# Patient Record
Sex: FEMALE | Race: OTHER | NOT HISPANIC OR LATINO
[De-identification: names, ages, dates, MRNs, and addresses within clinical notes are randomized per-mention and may not be internally consistent; named-entity substitution may affect disease eponyms.]

---

## 2020-04-22 ENCOUNTER — TRANSCRIPTION ENCOUNTER (OUTPATIENT)
Age: 30
End: 2020-04-22

## 2021-12-03 ENCOUNTER — EMERGENCY (EMERGENCY)
Facility: HOSPITAL | Age: 31
LOS: 1 days | Discharge: HOME | End: 2021-12-03
Attending: EMERGENCY MEDICINE | Admitting: EMERGENCY MEDICINE
Payer: COMMERCIAL

## 2021-12-03 VITALS
SYSTOLIC BLOOD PRESSURE: 112 MMHG | DIASTOLIC BLOOD PRESSURE: 67 MMHG | OXYGEN SATURATION: 100 % | TEMPERATURE: 98 F | RESPIRATION RATE: 16 BRPM | HEART RATE: 87 BPM

## 2021-12-03 PROCEDURE — 99284 EMERGENCY DEPT VISIT MOD MDM: CPT

## 2021-12-03 RX ORDER — CYCLOBENZAPRINE HYDROCHLORIDE 10 MG/1
1 TABLET, FILM COATED ORAL
Qty: 21 | Refills: 0
Start: 2021-12-03 | End: 2021-12-09

## 2021-12-03 RX ORDER — CYCLOBENZAPRINE HYDROCHLORIDE 10 MG/1
10 TABLET, FILM COATED ORAL ONCE
Refills: 0 | Status: COMPLETED | OUTPATIENT
Start: 2021-12-03 | End: 2021-12-03

## 2021-12-03 RX ORDER — IBUPROFEN 200 MG
1 TABLET ORAL
Qty: 30 | Refills: 0
Start: 2021-12-03 | End: 2021-12-12

## 2021-12-03 RX ORDER — IBUPROFEN 200 MG
600 TABLET ORAL ONCE
Refills: 0 | Status: COMPLETED | OUTPATIENT
Start: 2021-12-03 | End: 2021-12-03

## 2021-12-03 RX ADMIN — Medication 600 MILLIGRAM(S): at 22:22
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
## 2021-12-03 NOTE — ED PROVIDER NOTE - PATIENT PORTAL LINK FT
You can access the FollowMyHealth Patient Portal offered by [facility] by registering at the following website: [email or web address]. By joining [employer]’s FollowMyHealth portal, you will also be able to view your health information using other applications (apps) compatible with our system.

## 2021-12-03 NOTE — ED PROVIDER NOTE - MUSCULOSKELETAL MINIMAL EXAM
Paraspinal cervical spine and lumbar spine tenderness, normal range of motion for the right shoulder

## 2021-12-04 DIAGNOSIS — N20.0 CALCULUS OF KIDNEY: Chronic | ICD-10-CM

## 2021-12-04 PROCEDURE — 73030 X-RAY EXAM OF SHOULDER: CPT | Mod: 26,RT

## 2021-12-04 PROCEDURE — 72040 X-RAY EXAM NECK SPINE 2-3 VW: CPT | Mod: 26

## 2021-12-04 PROCEDURE — 72100 X-RAY EXAM L-S SPINE 2/3 VWS: CPT | Mod: 26

## 2022-11-14 PROBLEM — Z78.9 OTHER SPECIFIED HEALTH STATUS: Chronic | Status: ACTIVE | Noted: 2021-12-04

## 2022-12-13 ENCOUNTER — APPOINTMENT (OUTPATIENT)
Dept: UROLOGY | Facility: CLINIC | Age: 32
End: 2022-12-13

## 2022-12-13 PROBLEM — Z00.00 ENCOUNTER FOR PREVENTIVE HEALTH EXAMINATION: Status: ACTIVE | Noted: 2022-12-13

## 2023-07-18 ENCOUNTER — APPOINTMENT (OUTPATIENT)
Dept: ANTEPARTUM | Facility: CLINIC | Age: 33
End: 2023-07-18
Payer: COMMERCIAL

## 2023-07-18 ENCOUNTER — ASOB RESULT (OUTPATIENT)
Age: 33
End: 2023-07-18

## 2023-07-18 PROCEDURE — 76813 OB US NUCHAL MEAS 1 GEST: CPT

## 2023-07-18 PROCEDURE — 36415 COLL VENOUS BLD VENIPUNCTURE: CPT

## 2023-07-26 LAB
ADDITIONAL US: NORMAL
COMMENTS: AFP: NORMAL
CRL SCAN TWIN B: NORMAL
CRL SCAN: NORMAL
CROWN RUMP LENGTH TWIN B: NORMAL
CROWN RUMP LENGTH: 69 MM
DOWN SYNDROME AGE RISK: NORMAL
DOWN SYNDROME INTERPRETATION: NORMAL
DOWN SYNDROME SCREENING RISK: NORMAL
GEST. AGE ON COLLECTION DATE: 13 WEEKS
HCG MOM: 1.06
HCG VALUE: 89.7 IU/ML
MATERNAL AGE AT EDD: 32.2 YR
NOTE: AFP: NORMAL
NT MOM TWIN B: NORMAL
NT TWIN B: NORMAL
NUCHAL TRANSLUCENCY (NT): 1.2 MM
NUCHAL TRANSLUCENCY MOM: 0.67
NUMBER OF FETUSES: 1
PAPP-A MOM: 0.33
PAPP-A VALUE: 356 NG/ML
RACE: NORMAL
RESULTS AFP: NORMAL
SONOGRAPHER ID#: NORMAL
SUBMIT PART 2 SAMPLE USING: NORMAL
TEST RESULTS: AFP: NORMAL
TRISOMY 18 AGE RISK: NORMAL
TRISOMY 18 INTERPRETATION: NORMAL
TRISOMY 18 SCREENING RISK: NORMAL
WEIGHT AFP: 165 LBS

## 2023-08-07 NOTE — ED ADULT TRIAGE NOTE - BP NONINVASIVE DIASTOLIC (MM HG)
Spoke to patient about getting vitamin D drawn as she was advised to repeat it. Lab appointment has been scheduled. Patient would also like copy of DEXA scan report once resulted.    Medication(s) Requested: Vitamin D  Last office visit: 7/12/2023  Next office visit: 1/15/2024  Labs: 5/17/2023 - repeat in 2 months due to increase to 5,000 IU/day      ---- OV 7/12/2023 ----  Age related osteoporosis, unspecified pathological fracture presence  Currently on Reclast with most recent infusion being 2/13/23. Status post x 7 infusions.  - Recommend to start bisphosphonate holiday for now.  - Repeat DEXA scheduled 8/3/23.   - Continue calcium and vitamin D supplementation.       
68
no

## 2023-09-03 ENCOUNTER — NON-APPOINTMENT (OUTPATIENT)
Age: 33
End: 2023-09-03

## 2023-09-12 ENCOUNTER — APPOINTMENT (OUTPATIENT)
Dept: ANTEPARTUM | Facility: CLINIC | Age: 33
End: 2023-09-12
Payer: COMMERCIAL

## 2023-09-12 ENCOUNTER — ASOB RESULT (OUTPATIENT)
Age: 33
End: 2023-09-12

## 2023-09-12 PROCEDURE — 76811 OB US DETAILED SNGL FETUS: CPT

## 2023-09-12 PROCEDURE — 76817 TRANSVAGINAL US OBSTETRIC: CPT

## 2023-09-17 ENCOUNTER — NON-APPOINTMENT (OUTPATIENT)
Age: 33
End: 2023-09-17

## 2023-09-20 ENCOUNTER — ASOB RESULT (OUTPATIENT)
Age: 33
End: 2023-09-20

## 2023-09-20 ENCOUNTER — APPOINTMENT (OUTPATIENT)
Dept: ANTEPARTUM | Facility: CLINIC | Age: 33
End: 2023-09-20
Payer: COMMERCIAL

## 2023-09-20 PROCEDURE — 76816 OB US FOLLOW-UP PER FETUS: CPT

## 2023-10-04 ENCOUNTER — APPOINTMENT (OUTPATIENT)
Dept: ANTEPARTUM | Facility: CLINIC | Age: 33
End: 2023-10-04
Payer: COMMERCIAL

## 2023-10-04 ENCOUNTER — APPOINTMENT (OUTPATIENT)
Dept: MATERNAL FETAL MEDICINE | Facility: CLINIC | Age: 33
End: 2023-10-04
Payer: COMMERCIAL

## 2023-10-04 VITALS
DIASTOLIC BLOOD PRESSURE: 58 MMHG | BODY MASS INDEX: 31.18 KG/M2 | HEIGHT: 63 IN | SYSTOLIC BLOOD PRESSURE: 118 MMHG | WEIGHT: 176 LBS

## 2023-10-04 VITALS
HEART RATE: 109 BPM | HEIGHT: 63 IN | BODY MASS INDEX: 31.18 KG/M2 | WEIGHT: 176 LBS | SYSTOLIC BLOOD PRESSURE: 118 MMHG | OXYGEN SATURATION: 97 % | DIASTOLIC BLOOD PRESSURE: 58 MMHG

## 2023-10-04 PROCEDURE — 76816 OB US FOLLOW-UP PER FETUS: CPT

## 2023-10-04 PROCEDURE — ZZZZZ: CPT

## 2023-10-04 PROCEDURE — 99213 OFFICE O/P EST LOW 20 MIN: CPT

## 2023-10-04 RX ORDER — CHOLECALCIFEROL (VITAMIN D3) 25 MCG
27-0.8-228 TABLET,CHEWABLE ORAL
Refills: 0 | Status: ACTIVE | COMMUNITY
Start: 2023-10-04

## 2023-10-04 RX ORDER — KRILL/OM-3/DHA/EPA/PHOSPHO/AST 1000-230MG
81 CAPSULE ORAL
Refills: 0 | Status: ACTIVE | COMMUNITY
Start: 2023-10-04

## 2023-10-31 ENCOUNTER — ASOB RESULT (OUTPATIENT)
Age: 33
End: 2023-10-31

## 2023-10-31 ENCOUNTER — APPOINTMENT (OUTPATIENT)
Dept: MATERNAL FETAL MEDICINE | Facility: CLINIC | Age: 33
End: 2023-10-31
Payer: COMMERCIAL

## 2023-10-31 VITALS — BODY MASS INDEX: 28.35 KG/M2 | WEIGHT: 160 LBS | HEIGHT: 63 IN

## 2023-10-31 PROCEDURE — G0108 DIAB MANAGE TRN  PER INDIV: CPT | Mod: 95

## 2023-10-31 RX ORDER — BLOOD-GLUCOSE METER
W/DEVICE KIT MISCELLANEOUS
Qty: 1 | Refills: 0 | Status: ACTIVE | COMMUNITY
Start: 2023-10-31 | End: 1900-01-01

## 2023-10-31 RX ORDER — BLOOD-GLUCOSE METER
KIT MISCELLANEOUS
Qty: 1 | Refills: 3 | Status: ACTIVE | COMMUNITY
Start: 2023-10-31 | End: 1900-01-01

## 2023-10-31 RX ORDER — CHOLECALCIFEROL (VITAMIN D3) 10(400)/ML
DROPS ORAL
Qty: 100 | Refills: 3 | Status: ACTIVE | COMMUNITY
Start: 2023-10-31 | End: 1900-01-01

## 2023-11-02 ENCOUNTER — ASOB RESULT (OUTPATIENT)
Age: 33
End: 2023-11-02

## 2023-11-02 ENCOUNTER — APPOINTMENT (OUTPATIENT)
Dept: ANTEPARTUM | Facility: CLINIC | Age: 33
End: 2023-11-02
Payer: COMMERCIAL

## 2023-11-02 ENCOUNTER — APPOINTMENT (OUTPATIENT)
Dept: ANTEPARTUM | Facility: CLINIC | Age: 33
End: 2023-11-02

## 2023-11-02 PROCEDURE — 76816 OB US FOLLOW-UP PER FETUS: CPT

## 2023-11-03 RX ORDER — BLOOD-GLUCOSE METER
W/DEVICE KIT MISCELLANEOUS
Qty: 1 | Refills: 0 | Status: ACTIVE | COMMUNITY
Start: 2023-11-03 | End: 1900-01-01

## 2023-11-03 RX ORDER — BLOOD SUGAR DIAGNOSTIC
STRIP MISCELLANEOUS 4 TIMES DAILY
Qty: 1 | Refills: 3 | Status: ACTIVE | COMMUNITY
Start: 2023-11-03 | End: 1900-01-01

## 2023-11-03 RX ORDER — LANCETS 28 GAUGE
EACH MISCELLANEOUS
Qty: 1 | Refills: 3 | Status: ACTIVE | COMMUNITY
Start: 2023-11-03 | End: 1900-01-01

## 2023-11-06 ENCOUNTER — ASOB RESULT (OUTPATIENT)
Age: 33
End: 2023-11-06

## 2023-11-06 ENCOUNTER — APPOINTMENT (OUTPATIENT)
Dept: ANTEPARTUM | Facility: CLINIC | Age: 33
End: 2023-11-06
Payer: COMMERCIAL

## 2023-11-06 PROCEDURE — 76820 UMBILICAL ARTERY ECHO: CPT

## 2023-11-06 PROCEDURE — 76819 FETAL BIOPHYS PROFIL W/O NST: CPT

## 2023-11-07 ENCOUNTER — ASOB RESULT (OUTPATIENT)
Age: 33
End: 2023-11-07

## 2023-11-07 ENCOUNTER — APPOINTMENT (OUTPATIENT)
Dept: MATERNAL FETAL MEDICINE | Facility: CLINIC | Age: 33
End: 2023-11-07
Payer: COMMERCIAL

## 2023-11-07 PROCEDURE — G0108 DIAB MANAGE TRN  PER INDIV: CPT | Mod: 95

## 2023-11-13 ENCOUNTER — APPOINTMENT (OUTPATIENT)
Dept: ANTEPARTUM | Facility: CLINIC | Age: 33
End: 2023-11-13

## 2023-11-14 ENCOUNTER — ASOB RESULT (OUTPATIENT)
Age: 33
End: 2023-11-14

## 2023-11-14 ENCOUNTER — APPOINTMENT (OUTPATIENT)
Dept: MATERNAL FETAL MEDICINE | Facility: CLINIC | Age: 33
End: 2023-11-14
Payer: COMMERCIAL

## 2023-11-14 ENCOUNTER — APPOINTMENT (OUTPATIENT)
Dept: ANTEPARTUM | Facility: CLINIC | Age: 33
End: 2023-11-14
Payer: COMMERCIAL

## 2023-11-14 ENCOUNTER — NON-APPOINTMENT (OUTPATIENT)
Age: 33
End: 2023-11-14

## 2023-11-14 VITALS
HEART RATE: 94 BPM | WEIGHT: 177 LBS | BODY MASS INDEX: 31.36 KG/M2 | HEIGHT: 63 IN | RESPIRATION RATE: 16 BRPM | DIASTOLIC BLOOD PRESSURE: 64 MMHG | OXYGEN SATURATION: 99 % | SYSTOLIC BLOOD PRESSURE: 110 MMHG

## 2023-11-14 DIAGNOSIS — O35.9XX0 MATERNAL CARE FOR (SUSPECTED) FETAL ABNORMALITY AND DAMAGE, UNSPECIFIED, NOT APPLICABLE OR UNSPECIFIED: ICD-10-CM

## 2023-11-14 DIAGNOSIS — O24.419 GESTATIONAL DIABETES MELLITUS IN PREGNANCY, UNSPECIFIED CONTROL: ICD-10-CM

## 2023-11-14 PROCEDURE — 76820 UMBILICAL ARTERY ECHO: CPT

## 2023-11-14 PROCEDURE — 99214 OFFICE O/P EST MOD 30 MIN: CPT

## 2023-11-14 PROCEDURE — 76819 FETAL BIOPHYS PROFIL W/O NST: CPT

## 2023-11-20 ENCOUNTER — ASOB RESULT (OUTPATIENT)
Age: 33
End: 2023-11-20

## 2023-11-20 ENCOUNTER — APPOINTMENT (OUTPATIENT)
Dept: ANTEPARTUM | Facility: CLINIC | Age: 33
End: 2023-11-20
Payer: COMMERCIAL

## 2023-11-20 PROCEDURE — 76820 UMBILICAL ARTERY ECHO: CPT

## 2023-11-20 PROCEDURE — 76819 FETAL BIOPHYS PROFIL W/O NST: CPT

## 2023-11-27 ENCOUNTER — ASOB RESULT (OUTPATIENT)
Age: 33
End: 2023-11-27

## 2023-11-27 ENCOUNTER — APPOINTMENT (OUTPATIENT)
Dept: ANTEPARTUM | Facility: CLINIC | Age: 33
End: 2023-11-27
Payer: COMMERCIAL

## 2023-11-27 PROCEDURE — 76820 UMBILICAL ARTERY ECHO: CPT

## 2023-11-27 PROCEDURE — 76819 FETAL BIOPHYS PROFIL W/O NST: CPT

## 2023-11-28 ENCOUNTER — ASOB RESULT (OUTPATIENT)
Age: 33
End: 2023-11-28

## 2023-11-28 ENCOUNTER — APPOINTMENT (OUTPATIENT)
Dept: MATERNAL FETAL MEDICINE | Facility: CLINIC | Age: 33
End: 2023-11-28
Payer: COMMERCIAL

## 2023-11-28 VITALS — WEIGHT: 175 LBS | BODY MASS INDEX: 31.01 KG/M2 | HEIGHT: 63 IN

## 2023-11-28 PROCEDURE — G0108 DIAB MANAGE TRN  PER INDIV: CPT | Mod: 95

## 2023-12-04 ENCOUNTER — ASOB RESULT (OUTPATIENT)
Age: 33
End: 2023-12-04

## 2023-12-04 ENCOUNTER — APPOINTMENT (OUTPATIENT)
Dept: ANTEPARTUM | Facility: CLINIC | Age: 33
End: 2023-12-04
Payer: COMMERCIAL

## 2023-12-04 PROCEDURE — 76820 UMBILICAL ARTERY ECHO: CPT | Mod: 59

## 2023-12-04 PROCEDURE — 76816 OB US FOLLOW-UP PER FETUS: CPT

## 2023-12-04 PROCEDURE — 76819 FETAL BIOPHYS PROFIL W/O NST: CPT

## 2023-12-06 ENCOUNTER — APPOINTMENT (OUTPATIENT)
Dept: MATERNAL FETAL MEDICINE | Facility: CLINIC | Age: 33
End: 2023-12-06
Payer: COMMERCIAL

## 2023-12-06 ENCOUNTER — ASOB RESULT (OUTPATIENT)
Age: 33
End: 2023-12-06

## 2023-12-06 VITALS — BODY MASS INDEX: 30.74 KG/M2 | WEIGHT: 173.5 LBS | HEIGHT: 63 IN

## 2023-12-06 PROCEDURE — G0108 DIAB MANAGE TRN  PER INDIV: CPT | Mod: 95

## 2023-12-11 ENCOUNTER — APPOINTMENT (OUTPATIENT)
Dept: ANTEPARTUM | Facility: CLINIC | Age: 33
End: 2023-12-11
Payer: COMMERCIAL

## 2023-12-11 ENCOUNTER — ASOB RESULT (OUTPATIENT)
Age: 33
End: 2023-12-11

## 2023-12-11 PROCEDURE — 76820 UMBILICAL ARTERY ECHO: CPT

## 2023-12-11 PROCEDURE — 76819 FETAL BIOPHYS PROFIL W/O NST: CPT

## 2023-12-19 ENCOUNTER — ASOB RESULT (OUTPATIENT)
Age: 33
End: 2023-12-19

## 2023-12-19 ENCOUNTER — APPOINTMENT (OUTPATIENT)
Dept: ANTEPARTUM | Facility: CLINIC | Age: 33
End: 2023-12-19
Payer: COMMERCIAL

## 2023-12-19 PROCEDURE — 76819 FETAL BIOPHYS PROFIL W/O NST: CPT

## 2023-12-19 PROCEDURE — 76820 UMBILICAL ARTERY ECHO: CPT

## 2023-12-20 ENCOUNTER — APPOINTMENT (OUTPATIENT)
Dept: MATERNAL FETAL MEDICINE | Facility: CLINIC | Age: 33
End: 2023-12-20
Payer: COMMERCIAL

## 2023-12-20 ENCOUNTER — ASOB RESULT (OUTPATIENT)
Age: 33
End: 2023-12-20

## 2023-12-20 VITALS — BODY MASS INDEX: 30.83 KG/M2 | HEIGHT: 63 IN | WEIGHT: 174 LBS

## 2023-12-20 PROCEDURE — G0108 DIAB MANAGE TRN  PER INDIV: CPT | Mod: 95

## 2023-12-26 ENCOUNTER — APPOINTMENT (OUTPATIENT)
Dept: ANTEPARTUM | Facility: CLINIC | Age: 33
End: 2023-12-26
Payer: COMMERCIAL

## 2023-12-26 ENCOUNTER — ASOB RESULT (OUTPATIENT)
Age: 33
End: 2023-12-26

## 2023-12-26 ENCOUNTER — APPOINTMENT (OUTPATIENT)
Dept: MATERNAL FETAL MEDICINE | Facility: CLINIC | Age: 33
End: 2023-12-26
Payer: COMMERCIAL

## 2023-12-26 VITALS
HEIGHT: 63 IN | WEIGHT: 174 LBS | HEART RATE: 98 BPM | BODY MASS INDEX: 30.83 KG/M2 | OXYGEN SATURATION: 97 % | RESPIRATION RATE: 16 BRPM | SYSTOLIC BLOOD PRESSURE: 110 MMHG | DIASTOLIC BLOOD PRESSURE: 60 MMHG

## 2023-12-26 PROCEDURE — ZZZZZ: CPT

## 2023-12-26 PROCEDURE — 76818 FETAL BIOPHYS PROFILE W/NST: CPT

## 2023-12-26 PROCEDURE — 76816 OB US FOLLOW-UP PER FETUS: CPT

## 2023-12-26 PROCEDURE — 76820 UMBILICAL ARTERY ECHO: CPT | Mod: 59

## 2023-12-26 PROCEDURE — 99214 OFFICE O/P EST MOD 30 MIN: CPT

## 2023-12-27 NOTE — DISCUSSION/SUMMARY
[FreeTextEntry1] :  We had the pleasure of seeing your patient, HARRISON العراقي, for follow up Maternal Fetal Medicine consultation on Dec 26, 2023. As you know, she is a P0 at 36 weeks followed for low sujata-a, fetal growth restriction, abnormal fetal brain anatomy. She is feeling overall well today and without complaints. She has gestational diabetes which is controlled with diet.   Ms. العراقي had a fetal MRI on 12/15 due to enlarged cisterna magna. Relevant excerpt of report: "There is enlargement of the retrocerebellar cistern measuring 2.2 cm in AP dimension. The cerebellar vermis appears small but grossly normal in morphology and orientation. There is a normal tegmentovermian angle. There is no hydrocephalous. The ventricular system is normal, with normal lateral ventricles, third ventricle and fourth ventricles. There is no ventricular dilatation at any level. There is a normal cavum septum pellucidum. The corpus callosum is entirely visualized and is normal. There are no focal white matter or gray matter abnormalities. The gyral pattern is normal for age. The subarachnoid space is normal. "  I explained all of the findings appreciated on the ultrasound as well as the MRI. I discussed the nonvisualization of the pericallosal artery on the most recent scan as well as the difficulty appreciating the CSP which may suggest partial agensis of the corpus callosum.  I reviewed that if there is partial agenesis of the corpus callosum, a patient may be asymptomatic or may have seizures or developmental delay. 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 We discussed that in isolation, this is idiopathic. There is an association with blakes pouch cyst which is a usually benign cystic malformation of the posterior fossa. I do not appreciate a cyst in the posterior fossa on ultrasound.   The patient was overall reassured by the MRI report. We discussed the new onset fetal growth restriction and the recommendation for twice weekly fetal testing until delivery. She asked questions regarding delivery planning and I advised that she discuss these with her doctor.   Recommend:  - Twice weekly fetal testing with BPP/NST/UAD - Delivery between 38-39 weeks' gestation due to fetal growth restriction  Thank you for requesting a consultation on this patient. The total time spent in preparation for this visit, medical history taking, orders, review of records, counseling the patient, and writing this note was 65 minutes.   At the end of our discussion, the patient indicated that her questions were answered and she seemed satisfied with our discussion. Please do not hesitate to contact us with any questions.   Sincerely,     Carmina Bravo MD FACOG Maternal-Fetal Medicine

## 2023-12-29 ENCOUNTER — APPOINTMENT (OUTPATIENT)
Dept: ANTEPARTUM | Facility: CLINIC | Age: 33
End: 2023-12-29
Payer: COMMERCIAL

## 2023-12-29 ENCOUNTER — ASOB RESULT (OUTPATIENT)
Age: 33
End: 2023-12-29

## 2023-12-29 PROCEDURE — 76820 UMBILICAL ARTERY ECHO: CPT

## 2023-12-29 PROCEDURE — 76819 FETAL BIOPHYS PROFIL W/O NST: CPT

## 2024-01-02 ENCOUNTER — ASOB RESULT (OUTPATIENT)
Age: 34
End: 2024-01-02

## 2024-01-02 ENCOUNTER — APPOINTMENT (OUTPATIENT)
Dept: ANTEPARTUM | Facility: CLINIC | Age: 34
End: 2024-01-02
Payer: COMMERCIAL

## 2024-01-02 ENCOUNTER — APPOINTMENT (OUTPATIENT)
Dept: MATERNAL FETAL MEDICINE | Facility: CLINIC | Age: 34
End: 2024-01-02
Payer: COMMERCIAL

## 2024-01-02 VITALS — BODY MASS INDEX: 30.83 KG/M2 | WEIGHT: 174 LBS | HEIGHT: 63 IN

## 2024-01-02 PROCEDURE — 76820 UMBILICAL ARTERY ECHO: CPT

## 2024-01-02 PROCEDURE — 98960 EDU&TRN PT SELF-MGMT NQHP 1: CPT | Mod: 95

## 2024-01-02 PROCEDURE — G0108 DIAB MANAGE TRN  PER INDIV: CPT | Mod: 95

## 2024-01-02 PROCEDURE — 76819 FETAL BIOPHYS PROFIL W/O NST: CPT

## 2024-01-05 ENCOUNTER — ASOB RESULT (OUTPATIENT)
Age: 34
End: 2024-01-05

## 2024-01-05 ENCOUNTER — APPOINTMENT (OUTPATIENT)
Dept: ANTEPARTUM | Facility: CLINIC | Age: 34
End: 2024-01-05
Payer: COMMERCIAL

## 2024-01-05 PROCEDURE — 76820 UMBILICAL ARTERY ECHO: CPT

## 2024-01-05 PROCEDURE — 76819 FETAL BIOPHYS PROFIL W/O NST: CPT

## 2024-01-06 ENCOUNTER — OUTPATIENT (OUTPATIENT)
Dept: INPATIENT UNIT | Facility: HOSPITAL | Age: 34
LOS: 1 days | End: 2024-01-06
Payer: COMMERCIAL

## 2024-01-06 VITALS
HEART RATE: 103 BPM | DIASTOLIC BLOOD PRESSURE: 68 MMHG | RESPIRATION RATE: 18 BRPM | TEMPERATURE: 98 F | SYSTOLIC BLOOD PRESSURE: 116 MMHG

## 2024-01-06 DIAGNOSIS — O26.899 OTHER SPECIFIED PREGNANCY RELATED CONDITIONS, UNSPECIFIED TRIMESTER: ICD-10-CM

## 2024-01-06 DIAGNOSIS — N20.0 CALCULUS OF KIDNEY: Chronic | ICD-10-CM

## 2024-01-06 NOTE — OB RN TRIAGE NOTE - FALL HARM RISK - UNIVERSAL INTERVENTIONS
Bed in lowest position, wheels locked, appropriate side rails in place/Call bell, personal items and telephone in reach/Instruct patient to call for assistance before getting out of bed or chair/Non-slip footwear when patient is out of bed/[place] to call system/Physically safe environment - no spills, clutter or unnecessary equipment/Purposeful Proactive Rounding/Room/bathroom lighting operational, light cord in reach Bed in lowest position, wheels locked, appropriate side rails in place/Call bell, personal items and telephone in reach/Instruct patient to call for assistance before getting out of bed or chair/Non-slip footwear when patient is out of bed/[place] to call system/Physically safe environment - no spills, clutter or unnecessary equipment/Purposeful Proactive Rounding/Room/bathroom lighting operational, light cord in reach

## 2024-01-07 VITALS — DIASTOLIC BLOOD PRESSURE: 65 MMHG | HEART RATE: 88 BPM | SYSTOLIC BLOOD PRESSURE: 114 MMHG

## 2024-01-07 PROCEDURE — 87591 N.GONORRHOEAE DNA AMP PROB: CPT

## 2024-01-07 PROCEDURE — 59025 FETAL NON-STRESS TEST: CPT

## 2024-01-07 PROCEDURE — G0463: CPT

## 2024-01-07 PROCEDURE — 84112 EVAL AMNIOTIC FLUID PROTEIN: CPT

## 2024-01-07 PROCEDURE — 99203 OFFICE O/P NEW LOW 30 MIN: CPT | Mod: GC

## 2024-01-07 PROCEDURE — 87800 DETECT AGNT MULT DNA DIREC: CPT

## 2024-01-07 PROCEDURE — 87491 CHLMYD TRACH DNA AMP PROBE: CPT

## 2024-01-07 PROCEDURE — 83986 ASSAY PH BODY FLUID NOS: CPT

## 2024-01-07 NOTE — OB PROVIDER TRIAGE NOTE - HISTORY OF PRESENT ILLNESS
33y  at 37w6d GA who presents to L&D after passing her mucus plug at home, patient also unsure if she is leaking fluid. Also c/o mild vaginal spotting. Endorsing occasional CTX. She endorses good fetal movement. Denies fevers, chills, nausea, vomiting, chest pain, SOB, dizziness and headache. No other complaints at this time.     Prenatal course is significant for:  1. GDMA1    POB: denies  PGYN: -fibroids, -ovarian cysts, denies STD hx, denies abnormal PAPs   PMH: Denies  PSH: hx of shoulder and ankle surgery  SH: Denies EtOH, tobacco and illicit drug use during this pregnancy; feels safe at home   Meds: PNVs  Allergies: NKDA

## 2024-01-07 NOTE — OB PROVIDER TRIAGE NOTE - NSOBPROVIDERNOTE_OBGYN_ALL_OB_FT
A/P: 33y  at 37w6d GA who presents to L&D from r/o labor and ROM.  - VSS  - FHT reassuring  - SVE /-3  - nitrazine and amnisure negative  - GC/CT and affirm obtained    Plan to reassess for cervical change in 1 hr. Will be discussed with Dr. Mark. A/P: 33y  at 37w6d GA who presents to L&D from r/o labor and ROM.  - VSS  - FHT reassuring  - SVE /-3  - nitrazine and amnisure negative  - GC/CT and affirm obtained    Plan to reassess for cervical change in 1 hr. SVE Unchanged, patient has an outpatient appt with OBGYN on Tuesday. Return precautions given. Will be discussed with Dr. Mark.

## 2024-01-07 NOTE — OB PROVIDER TRIAGE NOTE - NSHPPHYSICALEXAM_GEN_ALL_CORE
T(C): 36.9 (01-06-24 @ 23:44), Max: 36.9 (01-06-24 @ 23:44)  HR: 103 (01-07-24 @ 00:01) (103 - 103)  BP: 116/68 (01-07-24 @ 00:01) (116/68 - 116/68)  RR: 18 (01-06-24 @ 23:44) (18 - 18)    Gen: NAD, well-appearing, AAOx3   Abd: Soft, gravid  Ext: non-tender, non-edematous  SSE: no gross pooling, thick yellow green discharge noted protruding from cervical os. No vaginal bleeding noted. Nitrazine neg, amnisure neg  SVE:  1/50/-3    FHT: baseline 150, moderate variability, +accels, -decels   [place]: CTX q4-6min T(C): 36.9 (01-06-24 @ 23:44), Max: 36.9 (01-06-24 @ 23:44)  HR: 103 (01-07-24 @ 00:01) (103 - 103)  BP: 116/68 (01-07-24 @ 00:01) (116/68 - 116/68)  RR: 18 (01-06-24 @ 23:44) (18 - 18)    Gen: NAD, well-appearing, AAOx3   Abd: Soft, gravid  Ext: non-tender, non-edematous  SSE: no gross pooling, thick yellow green discharge noted protruding from cervical os. No vaginal bleeding noted. Nitrazine neg, amnisure neg  SVE:  1/50/-3    FHT: baseline 150, moderate variability, +accels, -decels   [place]: CTX q4-6min

## 2024-01-07 NOTE — OB PROVIDER TRIAGE NOTE - ATTENDING COMMENTS
In brief patient is a 33y  at 37w6d GA who presents to L&D from r/o labor and ROM. Notes feeling contractions irregularly, no fever/chills, palpitations or foul smelling discharge. Unchanged on 2 exams by same examiner (/-3). FHT was found to be reactive and reassuring with ROM check negative. Yellow/Green discharge may be mucus however suspicion for vaginal infection, swabs sent and pending. F/u scheduled with primary OB on Tuesday and will call pt with results of swabs. Precautions reviewed.

## 2024-01-08 LAB
C TRACH RRNA SPEC QL NAA+PROBE: SIGNIFICANT CHANGE UP
C TRACH RRNA SPEC QL NAA+PROBE: SIGNIFICANT CHANGE UP
CANDIDA AB TITR SER: SIGNIFICANT CHANGE UP
CANDIDA AB TITR SER: SIGNIFICANT CHANGE UP
G VAGINALIS DNA SPEC QL NAA+PROBE: SIGNIFICANT CHANGE UP
G VAGINALIS DNA SPEC QL NAA+PROBE: SIGNIFICANT CHANGE UP
N GONORRHOEA RRNA SPEC QL NAA+PROBE: SIGNIFICANT CHANGE UP
N GONORRHOEA RRNA SPEC QL NAA+PROBE: SIGNIFICANT CHANGE UP
T VAGINALIS SPEC QL WET PREP: SIGNIFICANT CHANGE UP
T VAGINALIS SPEC QL WET PREP: SIGNIFICANT CHANGE UP

## 2024-01-09 ENCOUNTER — INPATIENT (INPATIENT)
Facility: HOSPITAL | Age: 34
LOS: 1 days | Discharge: HOME | End: 2024-01-11
Attending: OBSTETRICS & GYNECOLOGY | Admitting: OBSTETRICS & GYNECOLOGY
Payer: COMMERCIAL

## 2024-01-09 ENCOUNTER — APPOINTMENT (OUTPATIENT)
Dept: ANTEPARTUM | Facility: CLINIC | Age: 34
End: 2024-01-09

## 2024-01-09 ENCOUNTER — TRANSCRIPTION ENCOUNTER (OUTPATIENT)
Age: 34
End: 2024-01-09

## 2024-01-09 VITALS
DIASTOLIC BLOOD PRESSURE: 77 MMHG | HEART RATE: 89 BPM | RESPIRATION RATE: 19 BRPM | SYSTOLIC BLOOD PRESSURE: 129 MMHG | TEMPERATURE: 99 F

## 2024-01-09 DIAGNOSIS — O26.893 OTHER SPECIFIED PREGNANCY RELATED CONDITIONS, THIRD TRIMESTER: ICD-10-CM

## 2024-01-09 DIAGNOSIS — M51.26 OTHER INTERVERTEBRAL DISC DISPLACEMENT, LUMBAR REGION: Chronic | ICD-10-CM

## 2024-01-09 DIAGNOSIS — Z3A.37 37 WEEKS GESTATION OF PREGNANCY: ICD-10-CM

## 2024-01-09 DIAGNOSIS — Z03.71 ENCOUNTER FOR SUSPECTED PROBLEM WITH AMNIOTIC CAVITY AND MEMBRANE RULED OUT: ICD-10-CM

## 2024-01-09 DIAGNOSIS — O47.1 FALSE LABOR AT OR AFTER 37 COMPLETED WEEKS OF GESTATION: ICD-10-CM

## 2024-01-09 DIAGNOSIS — Z98.890 OTHER SPECIFIED POSTPROCEDURAL STATES: Chronic | ICD-10-CM

## 2024-01-09 DIAGNOSIS — N20.0 CALCULUS OF KIDNEY: Chronic | ICD-10-CM

## 2024-01-09 DIAGNOSIS — O26.899 OTHER SPECIFIED PREGNANCY RELATED CONDITIONS, UNSPECIFIED TRIMESTER: ICD-10-CM

## 2024-01-09 DIAGNOSIS — O24.410 GESTATIONAL DIABETES MELLITUS IN PREGNANCY, DIET CONTROLLED: ICD-10-CM

## 2024-01-09 DIAGNOSIS — Z3A.38 38 WEEKS GESTATION OF PREGNANCY: ICD-10-CM

## 2024-01-09 LAB
ALBUMIN SERPL ELPH-MCNC: 3.6 G/DL — SIGNIFICANT CHANGE UP (ref 3.3–5.2)
ALBUMIN SERPL ELPH-MCNC: 3.6 G/DL — SIGNIFICANT CHANGE UP (ref 3.3–5.2)
ALP SERPL-CCNC: 206 U/L — HIGH (ref 40–120)
ALP SERPL-CCNC: 206 U/L — HIGH (ref 40–120)
ALT FLD-CCNC: 20 U/L — SIGNIFICANT CHANGE UP
ALT FLD-CCNC: 20 U/L — SIGNIFICANT CHANGE UP
ANION GAP SERPL CALC-SCNC: 16 MMOL/L — SIGNIFICANT CHANGE UP (ref 5–17)
ANION GAP SERPL CALC-SCNC: 16 MMOL/L — SIGNIFICANT CHANGE UP (ref 5–17)
AST SERPL-CCNC: 21 U/L — SIGNIFICANT CHANGE UP
AST SERPL-CCNC: 21 U/L — SIGNIFICANT CHANGE UP
BASOPHILS # BLD AUTO: 0.03 K/UL — SIGNIFICANT CHANGE UP (ref 0–0.2)
BASOPHILS # BLD AUTO: 0.03 K/UL — SIGNIFICANT CHANGE UP (ref 0–0.2)
BASOPHILS NFR BLD AUTO: 0.2 % — SIGNIFICANT CHANGE UP (ref 0–2)
BASOPHILS NFR BLD AUTO: 0.2 % — SIGNIFICANT CHANGE UP (ref 0–2)
BILIRUB SERPL-MCNC: 0.6 MG/DL — SIGNIFICANT CHANGE UP (ref 0.4–2)
BILIRUB SERPL-MCNC: 0.6 MG/DL — SIGNIFICANT CHANGE UP (ref 0.4–2)
BLD GP AB SCN SERPL QL: SIGNIFICANT CHANGE UP
BLD GP AB SCN SERPL QL: SIGNIFICANT CHANGE UP
BUN SERPL-MCNC: 12.2 MG/DL — SIGNIFICANT CHANGE UP (ref 8–20)
BUN SERPL-MCNC: 12.2 MG/DL — SIGNIFICANT CHANGE UP (ref 8–20)
CALCIUM SERPL-MCNC: 9.3 MG/DL — SIGNIFICANT CHANGE UP (ref 8.4–10.5)
CALCIUM SERPL-MCNC: 9.3 MG/DL — SIGNIFICANT CHANGE UP (ref 8.4–10.5)
CHLORIDE SERPL-SCNC: 103 MMOL/L — SIGNIFICANT CHANGE UP (ref 96–108)
CHLORIDE SERPL-SCNC: 103 MMOL/L — SIGNIFICANT CHANGE UP (ref 96–108)
CO2 SERPL-SCNC: 20 MMOL/L — LOW (ref 22–29)
CO2 SERPL-SCNC: 20 MMOL/L — LOW (ref 22–29)
CREAT SERPL-MCNC: 0.51 MG/DL — SIGNIFICANT CHANGE UP (ref 0.5–1.3)
CREAT SERPL-MCNC: 0.51 MG/DL — SIGNIFICANT CHANGE UP (ref 0.5–1.3)
EGFR: 127 ML/MIN/1.73M2 — SIGNIFICANT CHANGE UP
EGFR: 127 ML/MIN/1.73M2 — SIGNIFICANT CHANGE UP
EOSINOPHIL # BLD AUTO: 0.03 K/UL — SIGNIFICANT CHANGE UP (ref 0–0.5)
EOSINOPHIL # BLD AUTO: 0.03 K/UL — SIGNIFICANT CHANGE UP (ref 0–0.5)
EOSINOPHIL NFR BLD AUTO: 0.2 % — SIGNIFICANT CHANGE UP (ref 0–6)
EOSINOPHIL NFR BLD AUTO: 0.2 % — SIGNIFICANT CHANGE UP (ref 0–6)
GLUCOSE BLDC GLUCOMTR-MCNC: 112 MG/DL — HIGH (ref 70–99)
GLUCOSE BLDC GLUCOMTR-MCNC: 112 MG/DL — HIGH (ref 70–99)
GLUCOSE BLDC GLUCOMTR-MCNC: 81 MG/DL — SIGNIFICANT CHANGE UP (ref 70–99)
GLUCOSE BLDC GLUCOMTR-MCNC: 81 MG/DL — SIGNIFICANT CHANGE UP (ref 70–99)
GLUCOSE SERPL-MCNC: 77 MG/DL — SIGNIFICANT CHANGE UP (ref 70–99)
GLUCOSE SERPL-MCNC: 77 MG/DL — SIGNIFICANT CHANGE UP (ref 70–99)
HCT VFR BLD CALC: 37.2 % — SIGNIFICANT CHANGE UP (ref 34.5–45)
HCT VFR BLD CALC: 37.2 % — SIGNIFICANT CHANGE UP (ref 34.5–45)
HGB BLD-MCNC: 12.3 G/DL — SIGNIFICANT CHANGE UP (ref 11.5–15.5)
HGB BLD-MCNC: 12.3 G/DL — SIGNIFICANT CHANGE UP (ref 11.5–15.5)
HIV 1 & 2 AB SERPL IA.RAPID: SIGNIFICANT CHANGE UP
HIV 1 & 2 AB SERPL IA.RAPID: SIGNIFICANT CHANGE UP
HIV 1+2 AB+HIV1 P24 AG SERPL QL IA: SIGNIFICANT CHANGE UP
HIV 1+2 AB+HIV1 P24 AG SERPL QL IA: SIGNIFICANT CHANGE UP
IMM GRANULOCYTES NFR BLD AUTO: 0.4 % — SIGNIFICANT CHANGE UP (ref 0–0.9)
IMM GRANULOCYTES NFR BLD AUTO: 0.4 % — SIGNIFICANT CHANGE UP (ref 0–0.9)
LYMPHOCYTES # BLD AUTO: 1.19 K/UL — SIGNIFICANT CHANGE UP (ref 1–3.3)
LYMPHOCYTES # BLD AUTO: 1.19 K/UL — SIGNIFICANT CHANGE UP (ref 1–3.3)
LYMPHOCYTES # BLD AUTO: 7.9 % — LOW (ref 13–44)
LYMPHOCYTES # BLD AUTO: 7.9 % — LOW (ref 13–44)
MCHC RBC-ENTMCNC: 25.4 PG — LOW (ref 27–34)
MCHC RBC-ENTMCNC: 25.4 PG — LOW (ref 27–34)
MCHC RBC-ENTMCNC: 33.1 GM/DL — SIGNIFICANT CHANGE UP (ref 32–36)
MCHC RBC-ENTMCNC: 33.1 GM/DL — SIGNIFICANT CHANGE UP (ref 32–36)
MCV RBC AUTO: 76.7 FL — LOW (ref 80–100)
MCV RBC AUTO: 76.7 FL — LOW (ref 80–100)
MEV IGG SER-ACNC: 163 AU/ML — SIGNIFICANT CHANGE UP
MEV IGG SER-ACNC: 163 AU/ML — SIGNIFICANT CHANGE UP
MEV IGG+IGM SER-IMP: POSITIVE — SIGNIFICANT CHANGE UP
MEV IGG+IGM SER-IMP: POSITIVE — SIGNIFICANT CHANGE UP
MONOCYTES # BLD AUTO: 0.9 K/UL — SIGNIFICANT CHANGE UP (ref 0–0.9)
MONOCYTES # BLD AUTO: 0.9 K/UL — SIGNIFICANT CHANGE UP (ref 0–0.9)
MONOCYTES NFR BLD AUTO: 5.9 % — SIGNIFICANT CHANGE UP (ref 2–14)
MONOCYTES NFR BLD AUTO: 5.9 % — SIGNIFICANT CHANGE UP (ref 2–14)
NEUTROPHILS # BLD AUTO: 12.94 K/UL — HIGH (ref 1.8–7.4)
NEUTROPHILS # BLD AUTO: 12.94 K/UL — HIGH (ref 1.8–7.4)
NEUTROPHILS NFR BLD AUTO: 85.4 % — HIGH (ref 43–77)
NEUTROPHILS NFR BLD AUTO: 85.4 % — HIGH (ref 43–77)
PLATELET # BLD AUTO: 242 K/UL — SIGNIFICANT CHANGE UP (ref 150–400)
PLATELET # BLD AUTO: 242 K/UL — SIGNIFICANT CHANGE UP (ref 150–400)
POTASSIUM SERPL-MCNC: 4 MMOL/L — SIGNIFICANT CHANGE UP (ref 3.5–5.3)
POTASSIUM SERPL-MCNC: 4 MMOL/L — SIGNIFICANT CHANGE UP (ref 3.5–5.3)
POTASSIUM SERPL-SCNC: 4 MMOL/L — SIGNIFICANT CHANGE UP (ref 3.5–5.3)
POTASSIUM SERPL-SCNC: 4 MMOL/L — SIGNIFICANT CHANGE UP (ref 3.5–5.3)
PROT SERPL-MCNC: 7.2 G/DL — SIGNIFICANT CHANGE UP (ref 6.6–8.7)
PROT SERPL-MCNC: 7.2 G/DL — SIGNIFICANT CHANGE UP (ref 6.6–8.7)
RBC # BLD: 4.85 M/UL — SIGNIFICANT CHANGE UP (ref 3.8–5.2)
RBC # BLD: 4.85 M/UL — SIGNIFICANT CHANGE UP (ref 3.8–5.2)
RBC # FLD: 16.8 % — HIGH (ref 10.3–14.5)
RBC # FLD: 16.8 % — HIGH (ref 10.3–14.5)
SODIUM SERPL-SCNC: 139 MMOL/L — SIGNIFICANT CHANGE UP (ref 135–145)
SODIUM SERPL-SCNC: 139 MMOL/L — SIGNIFICANT CHANGE UP (ref 135–145)
T PALLIDUM AB TITR SER: NEGATIVE — SIGNIFICANT CHANGE UP
T PALLIDUM AB TITR SER: NEGATIVE — SIGNIFICANT CHANGE UP
WBC # BLD: 15.15 K/UL — HIGH (ref 3.8–10.5)
WBC # BLD: 15.15 K/UL — HIGH (ref 3.8–10.5)
WBC # FLD AUTO: 15.15 K/UL — HIGH (ref 3.8–10.5)
WBC # FLD AUTO: 15.15 K/UL — HIGH (ref 3.8–10.5)

## 2024-01-09 RX ORDER — SODIUM CHLORIDE 9 MG/ML
3 INJECTION INTRAMUSCULAR; INTRAVENOUS; SUBCUTANEOUS EVERY 8 HOURS
Refills: 0 | Status: DISCONTINUED | OUTPATIENT
Start: 2024-01-09 | End: 2024-01-11

## 2024-01-09 RX ORDER — MAGNESIUM HYDROXIDE 400 MG/1
30 TABLET, CHEWABLE ORAL
Refills: 0 | Status: DISCONTINUED | OUTPATIENT
Start: 2024-01-09 | End: 2024-01-11

## 2024-01-09 RX ORDER — DIPHENHYDRAMINE HCL 50 MG
25 CAPSULE ORAL EVERY 6 HOURS
Refills: 0 | Status: DISCONTINUED | OUTPATIENT
Start: 2024-01-09 | End: 2024-01-11

## 2024-01-09 RX ORDER — TETANUS TOXOID, REDUCED DIPHTHERIA TOXOID AND ACELLULAR PERTUSSIS VACCINE, ADSORBED 5; 2.5; 8; 8; 2.5 [IU]/.5ML; [IU]/.5ML; UG/.5ML; UG/.5ML; UG/.5ML
0.5 SUSPENSION INTRAMUSCULAR ONCE
Refills: 0 | Status: COMPLETED | OUTPATIENT
Start: 2024-01-09

## 2024-01-09 RX ORDER — BENZOCAINE 10 %
1 GEL (GRAM) MUCOUS MEMBRANE EVERY 6 HOURS
Refills: 0 | Status: DISCONTINUED | OUTPATIENT
Start: 2024-01-09 | End: 2024-01-11

## 2024-01-09 RX ORDER — OXYTOCIN 10 UNIT/ML
VIAL (ML) INJECTION
Qty: 30 | Refills: 0 | Status: DISCONTINUED | OUTPATIENT
Start: 2024-01-09 | End: 2024-01-09

## 2024-01-09 RX ORDER — OXYCODONE HYDROCHLORIDE 5 MG/1
5 TABLET ORAL
Refills: 0 | Status: DISCONTINUED | OUTPATIENT
Start: 2024-01-09 | End: 2024-01-11

## 2024-01-09 RX ORDER — KETOROLAC TROMETHAMINE 30 MG/ML
30 SYRINGE (ML) INJECTION ONCE
Refills: 0 | Status: DISCONTINUED | OUTPATIENT
Start: 2024-01-09 | End: 2024-01-09

## 2024-01-09 RX ORDER — PRAMOXINE HYDROCHLORIDE 150 MG/15G
1 AEROSOL, FOAM RECTAL EVERY 4 HOURS
Refills: 0 | Status: DISCONTINUED | OUTPATIENT
Start: 2024-01-09 | End: 2024-01-11

## 2024-01-09 RX ORDER — HYDROCORTISONE 1 %
1 OINTMENT (GRAM) TOPICAL EVERY 6 HOURS
Refills: 0 | Status: DISCONTINUED | OUTPATIENT
Start: 2024-01-09 | End: 2024-01-11

## 2024-01-09 RX ORDER — OXYTOCIN 10 UNIT/ML
333.33 VIAL (ML) INJECTION
Qty: 20 | Refills: 0 | Status: DISCONTINUED | OUTPATIENT
Start: 2024-01-09 | End: 2024-01-11

## 2024-01-09 RX ORDER — SODIUM CHLORIDE 9 MG/ML
1000 INJECTION, SOLUTION INTRAVENOUS ONCE
Refills: 0 | Status: COMPLETED | OUTPATIENT
Start: 2024-01-09 | End: 2024-01-09

## 2024-01-09 RX ORDER — AER TRAVELER 0.5 G/1
1 SOLUTION RECTAL; TOPICAL EVERY 4 HOURS
Refills: 0 | Status: DISCONTINUED | OUTPATIENT
Start: 2024-01-09 | End: 2024-01-11

## 2024-01-09 RX ORDER — CHLORHEXIDINE GLUCONATE 213 G/1000ML
1 SOLUTION TOPICAL DAILY
Refills: 0 | Status: DISCONTINUED | OUTPATIENT
Start: 2024-01-09 | End: 2024-01-09

## 2024-01-09 RX ORDER — ACETAMINOPHEN 500 MG
975 TABLET ORAL
Refills: 0 | Status: DISCONTINUED | OUTPATIENT
Start: 2024-01-09 | End: 2024-01-11

## 2024-01-09 RX ORDER — SODIUM CHLORIDE 9 MG/ML
1000 INJECTION, SOLUTION INTRAVENOUS
Refills: 0 | Status: DISCONTINUED | OUTPATIENT
Start: 2024-01-09 | End: 2024-01-09

## 2024-01-09 RX ORDER — IBUPROFEN 200 MG
600 TABLET ORAL EVERY 6 HOURS
Refills: 0 | Status: DISCONTINUED | OUTPATIENT
Start: 2024-01-09 | End: 2024-01-11

## 2024-01-09 RX ORDER — IBUPROFEN 200 MG
600 TABLET ORAL EVERY 6 HOURS
Refills: 0 | Status: COMPLETED | OUTPATIENT
Start: 2024-01-09 | End: 2024-12-07

## 2024-01-09 RX ORDER — CITRIC ACID/SODIUM CITRATE 300-500 MG
30 SOLUTION, ORAL ORAL ONCE
Refills: 0 | Status: DISCONTINUED | OUTPATIENT
Start: 2024-01-09 | End: 2024-01-09

## 2024-01-09 RX ORDER — SIMETHICONE 80 MG/1
80 TABLET, CHEWABLE ORAL EVERY 4 HOURS
Refills: 0 | Status: DISCONTINUED | OUTPATIENT
Start: 2024-01-09 | End: 2024-01-11

## 2024-01-09 RX ORDER — DIBUCAINE 1 %
1 OINTMENT (GRAM) RECTAL EVERY 6 HOURS
Refills: 0 | Status: DISCONTINUED | OUTPATIENT
Start: 2024-01-09 | End: 2024-01-11

## 2024-01-09 RX ORDER — OXYCODONE HYDROCHLORIDE 5 MG/1
5 TABLET ORAL ONCE
Refills: 0 | Status: DISCONTINUED | OUTPATIENT
Start: 2024-01-09 | End: 2024-01-11

## 2024-01-09 RX ORDER — SODIUM CHLORIDE 9 MG/ML
1000 INJECTION, SOLUTION INTRAVENOUS
Refills: 0 | Status: DISCONTINUED | OUTPATIENT
Start: 2024-01-09 | End: 2024-01-11

## 2024-01-09 RX ORDER — LANOLIN
1 OINTMENT (GRAM) TOPICAL EVERY 6 HOURS
Refills: 0 | Status: DISCONTINUED | OUTPATIENT
Start: 2024-01-09 | End: 2024-01-11

## 2024-01-09 RX ORDER — OXYTOCIN 10 UNIT/ML
41.67 VIAL (ML) INJECTION
Qty: 20 | Refills: 0 | Status: DISCONTINUED | OUTPATIENT
Start: 2024-01-09 | End: 2024-01-11

## 2024-01-09 RX ADMIN — SODIUM CHLORIDE 125 MILLILITER(S): 9 INJECTION, SOLUTION INTRAVENOUS at 09:20

## 2024-01-09 RX ADMIN — Medication 30 MILLIGRAM(S): at 19:47

## 2024-01-09 RX ADMIN — SODIUM CHLORIDE 125 MILLILITER(S): 9 INJECTION, SOLUTION INTRAVENOUS at 12:30

## 2024-01-09 RX ADMIN — SODIUM CHLORIDE 125 MILLILITER(S): 9 INJECTION, SOLUTION INTRAVENOUS at 13:23

## 2024-01-09 RX ADMIN — Medication 125 MILLIUNIT(S)/MIN: at 19:14

## 2024-01-09 RX ADMIN — SODIUM CHLORIDE 1000 MILLILITER(S): 9 INJECTION, SOLUTION INTRAVENOUS at 12:20

## 2024-01-09 RX ADMIN — SODIUM CHLORIDE 125 MILLILITER(S): 9 INJECTION, SOLUTION INTRAVENOUS at 17:43

## 2024-01-09 NOTE — OB PROVIDER H&P - ASSESSMENT
Patient is a 33 y.o.  at 38 weeks 1 days gestation admitted to L&D for labor. Cat 1 FHT.    - consent  - admission labs  - IV hydration  - continuous toco and fetal heart monitoring  - GBS negative, per pt  - pain management: epidural prn    Discussed with Dr. Mccoy

## 2024-01-09 NOTE — OB PROVIDER DELIVERY SUMMARY - NSPROVIDERDELIVERYNOTE_OBGYN_ALL_OB_FT
Patient felt rectal pressure and was found to be fully dilated, 0 station. She pushed effectively for 60 minutes, and delivered a viable female infant at 1914. Vertex delivered without difficulty, nuchal cord x1, both shoulders then delivered without difficulty.  Delayed cord clamping for approximately 60 seconds, and skin to skin was initiated. Cord segment was clamped and cut for cord blood and gas collection. Placenta delivered spontaneously and intact at 1925. Pitocin started. Uterus, cervix, perineum and vagina were inspected and a second degree laceration was noted and repaired with 2-0 vicryl. Excellent hemostasis was achieved. Apgars 9,9. Patient felt rectal pressure and was found to be fully dilated, 0 station. She pushed effectively for 60 minutes, and delivered a viable female infant at 1914. Vertex delivered without difficulty, nuchal cord x1, both shoulders then delivered without difficulty.  Delayed cord clamping for approximately 60 seconds, and skin to skin was initiated. Cord segment was clamped and cut for cord blood and gas collection. Placenta delivered spontaneously and intact at 1925. Pitocin started. Uterus, cervix, perineum and vagina were inspected and a second degree laceration was noted and repaired with 2-0 vicryl. Excellent hemostasis was achieved. Apgars 9,9.      [facility] on-call attending -  resident note reviewed, uncomplicated vaginal delivery   Laine Mccoy MD Patient felt rectal pressure and was found to be fully dilated, 0 station. She pushed effectively for 60 minutes, and delivered a viable female infant at 1914. Vertex delivered without difficulty, nuchal cord x1, both shoulders then delivered without difficulty.  Delayed cord clamping for approximately 60 seconds, and skin to skin was initiated. Cord segment was clamped and cut for cord blood and gas collection. Placenta delivered spontaneously and intact at 1925. Pitocin started. Uterus, cervix, perineum and vagina were inspected and a second degree laceration was noted and repaired with 2-0 vicryl. Excellent hemostasis was achieved. Apgars 9,9.      [facility] on-call attending -  resident note reviewed, uncomplicated vaginal delivery   Laine Mccoy MD

## 2024-01-09 NOTE — OB PROVIDER H&P - NSHPPHYSICALEXAM_GEN_ALL_CORE
Vital Signs Last 24 Hrs  T(C): 37.3 (09 Jan 2024 07:25), Max: 37.3 (09 Jan 2024 07:25)  T(F): 99.14 (09 Jan 2024 07:25), Max: 99.14 (09 Jan 2024 07:25)  HR: 89 (09 Jan 2024 07:25) (89 - 89)  BP: 129/77 (09 Jan 2024 07:25) (129/77 - 129/77)  RR: 19 (09 Jan 2024 07:25) (19 - 19)    Gen: NAD  Abd: gravid, non-tender  SVE: 4/100/-1  FHT: 145 bpm, moderate variability, - accels, - decels   [place]: q4-6 min  Bedside US: vertex, anterior placenta Vital Signs Last 24 Hrs  T(C): 37.3 (09 Jan 2024 07:25), Max: 37.3 (09 Jan 2024 07:25)  T(F): 99.14 (09 Jan 2024 07:25), Max: 99.14 (09 Jan 2024 07:25)  HR: 89 (09 Jan 2024 07:25) (89 - 89)  BP: 129/77 (09 Jan 2024 07:25) (129/77 - 129/77)  RR: 19 (09 Jan 2024 07:25) (19 - 19)    Gen: NAD  Abd: gravid, non-tender  SVE: 4/100/-1  FHT: 145 bpm, moderate variability, - accels, - decels   [place]: q4-6 min  Bedside US: vertex, anterior placenta

## 2024-01-09 NOTE — DISCHARGE NOTE OB - MEDICATION SUMMARY - MEDICATIONS TO TAKE
I will START or STAY ON the medications listed below when I get home from the hospital:     mg oral tablet  -- 1 tab(s) by mouth every 8 hours - for moderate pain  -- Do not take this drug if you are pregnant.  It is very important that you take or use this exactly as directed.  Do not skip doses or discontinue unless directed by your doctor.  May cause drowsiness or dizziness.  Obtain medical advice before taking any non-prescription drugs as some may affect the action of this medication.  Take with food or milk.      -- Indication: For pain    acetaminophen 325 mg oral tablet  -- 3 tab(s) by mouth every 6 hours  -- Indication: For pain

## 2024-01-09 NOTE — OB RN DELIVERY SUMMARY - NS_SEPSISRSKCALC_OBGYN_ALL_OB_FT
GBS status in the 'Prenatal Lab tests/results section' on the OB RN Patient Profile must be documented.   EOS calculated successfully. EOS Risk Factor: 0.5/1000 live births ([facility] national incidence); GA=38w1d; Temp=99.14; ROM=10.65; GBS='Negative'; Antibiotics='No antibiotics or any antibiotics < 2 hrs prior to birth'   EOS calculated successfully. EOS Risk Factor: 0.5/1000 live births ([facility] national incidence); GA=38w1d; Temp=99.14; ROM=10.65; GBS='Negative'; Antibiotics='No antibiotics or any antibiotics < 2 hrs prior to birth'

## 2024-01-09 NOTE — OB PROVIDER DELIVERY SUMMARY - NSSELHIDDEN_OBGYN_ALL_OB_FT
[NS_DeliveryAttending1_OBGYN_ALL_OB_FT:[id number]=],[NS_DeliveryAssist1_OBGYN_ALL_OB_FT:[id number]=],[NS_DeliveryRN_OBGYN_ALL_OB_FT:[id number]] [NS_DeliveryAttending1_OBGYN_ALL_OB_FT:[id number]=],[NS_DeliveryAssist1_OBGYN_ALL_OB_FT:[id number]=],[NS_DeliveryRN_OBGYN_ALL_OB_FT:[id number]=]

## 2024-01-09 NOTE — OB PROVIDER H&P - ATTENDING COMMENTS
patient admitted in labor, light meconium stained fluid  prenatal care with dr hernandez, and been seeing [facility] for the following:   Poor fetal growth/small for dates, third   trimester, garcia -  EFW 10% & AC 9% on 23   Gestational diabetes, diet controlled            Abnormal ultrasonic finding on   screening - enlarged cisterna magna (MRI confirms enlarged cisterna magna, no cervical spinal stenosis)-NICU notified   High risk pregnancy with low FELICITY-A level   (0.3 percentile)   Umbilical vein varix    FHRT reassuring  GBS negative per patient  epidural as needed  will consider augmentation of labor if necessary  Laine Mccoy MD patient admitted in labor, light meconium stained fluid  prenatal care with dr hernandez, and been seeing [facility] for the following:   Poor fetal growth/small for dates, third   trimester, garcia -  EFW 10% & AC 9% on 23   Gestational diabetes, diet controlled            Abnormal ultrasonic finding on   screening - enlarged cisterna magna (MRI confirms enlarged cisterna magna, no cervical spinal stenosis)-NICU notified   High risk pregnancy with low FELICITY-A level   (0.3 percentile)   Umbilical vein varix    FHRT reassuring  GBS negative per patient  epidural as needed  will consider augmentation of labor if necessary  Laine Mccoy MD

## 2024-01-09 NOTE — OB PROVIDER H&P - NSTOBACCOSCREENHP_GEN_A_NCS
decreased endurance/impaired balance/narrow base of support/impaired postural control/decreased strength No

## 2024-01-09 NOTE — OB RN PATIENT PROFILE - PRO ANTIBODY SCREEN
February 26, 2018      Alma Kraft  [address]  [place] [address]        Dear ,    We are writing to inform you of your test results.    Normal lipid profile- LDL or bad cholesterol is at goal.  Hepatitis C screening is Negative (Normal)    Resulted Orders   Lipid Profile   Result Value Ref Range    Cholesterol 166 <200 mg/dL    HDL Cholesterol 61 >50 mg/dL    Triglycerides 99 <150 mg/dL    LDL Cholesterol Calculated 86 mg/dL (calc)      Comment:      Reference range: <100     Desirable range <100 mg/dL for patients with CHD or  diabetes and <70 mg/dL for diabetic patients with  known heart disease.     LDL-C is now calculated using the Stevie-Ford   calculation, which is a validated novel method providing   better accuracy than the Friedewald equation in the   estimation of LDL-C.   Stevie SS et al. KATERYNA. 2013;310(19): 8220-0907   (http://education.[employer]/faq/PCC364)      Cholesterol/HDL Ratio 2.7 <5.0 (calc)    Non HDL Cholesterol 105 <130 mg/dL (calc)      Comment:      For patients with diabetes plus 1 major ASCVD risk   factor, treating to a non-HDL-C goal of <100 mg/dL   (LDL-C of <70 mg/dL) is considered a therapeutic   option.     Hepatits C antibody (QUEST)   Result Value Ref Range    HCV Antibody NON-REACTIVE NON-REACTIVE    SIGNAL TO CUT OFF - QUEST 0.02 <1.00       If you have any questions or concerns, please call the clinic at the number listed above.       Sincerely,        Susu Brody MD                 negative

## 2024-01-09 NOTE — OB PROVIDER H&P - NSPREVIOUSTERM_OBGYN_ALL_OB
Quality 431: Preventive Care And Screening: Unhealthy Alcohol Use - Screening: Patient screened for unhealthy alcohol use using a single question and scores 2 or greater episodes per year and brief intervention did not occur Quality 110: Preventive Care And Screening: Influenza Immunization: Influenza Immunization Administered during Influenza season Quality 402: Tobacco Use And Help With Quitting Among Adolescents: Patient screened for tobacco and never smoked Detail Level: Zone No

## 2024-01-09 NOTE — DISCHARGE NOTE OB - MEDICATION SUMMARY - MEDICATIONS TO CHANGE
I will SWITCH the dose or number of times a day I take the medications listed below when I get home from the hospital:     mg oral tablet  -- 1 tab(s) by mouth every 8 hours - for moderate pain  -- Do not take this drug if you are pregnant.  It is very important that you take or use this exactly as directed.  Do not skip doses or discontinue unless directed by your doctor.  May cause drowsiness or dizziness.  Obtain medical advice before taking any non-prescription drugs as some may affect the action of this medication.  Take with food or milk.

## 2024-01-09 NOTE — DISCHARGE NOTE OB - NS MD DC FALL RISK RISK
For information on Fall & Injury Prevention, visit: [email or web address]-[email or web address] OR  [email or web address]://[email or web address]/[email or web address]-[email or web address]-avoid-injury OR  https://www.cdc.gov/steadi/patient.html For information on Fall & Injury Prevention, visit: https://www.[facility]/news/fall-prevention-protects-and-maintains-health-and-mobility OR  https://www.[facility]/news/fall-prevention-tips-to-avoid-injury OR  https://www.cdc.gov/steadi/patient.html

## 2024-01-09 NOTE — OB RN DELIVERY SUMMARY - NSSELHIDDEN_OBGYN_ALL_OB_FT
[NS_DeliveryAttending1_OBGYN_ALL_OB_FT:[id number]=],[NS_DeliveryAssist1_OBGYN_ALL_OB_FT:[id number]=],[NS_DeliveryRN_OBGYN_ALL_OB_FT:[id number]] [NS_DeliveryAttending1_OBGYN_ALL_OB_FT:[id number]=],[NS_DeliveryAssist1_OBGYN_ALL_OB_FT:[id number]=],[NS_DeliveryRN_OBGYN_ALL_OB_FT:[id number]=]

## 2024-01-09 NOTE — OB RN PATIENT PROFILE - FALL HARM RISK - UNIVERSAL INTERVENTIONS
Bed in lowest position, wheels locked, appropriate side rails in place/Call bell, personal items and telephone in reach/Instruct patient to call for assistance before getting out of bed or chair/Non-slip footwear when patient is out of bed/[place] to call system/Physically safe environment - no spills, clutter or unnecessary equipment/Purposeful Proactive Rounding/Room/bathroom lighting operational, light cord in reach Bed in lowest position, wheels locked, appropriate side rails in place/Call bell, personal items and telephone in reach/Instruct patient to call for assistance before getting out of bed or chair/Non-slip footwear when patient is out of bed/[place] to call system/Physically safe environment - no spills, clutter or unnecessary equipment/Purposeful Proactive Rounding/Room/bathroom lighting operational, light cord in reach

## 2024-01-09 NOTE — DISCHARGE NOTE OB - PROVIDER TOKENS
PROVIDER:[TOKEN:[[id number]],FOLLOWUP:[2 weeks],ESTABLISHEDPATIENT:[T]] PROVIDER:[TOKEN:[[id number]],FOLLOWUP:[2 weeks],ESTABLISHEDPATIENT:[T]]

## 2024-01-09 NOTE — OB RN PATIENT PROFILE - CURRENT PREGNANCY COMPLICATIONS, OB PROFILE
Cephalexin Pregnancy And Lactation Text: This medication is Pregnancy Category B and considered safe during pregnancy.  It is also excreted in breast milk but can be used safely for shorter doses. Gestational Diabetes/Hypertensive Disorder/Intrauterine Growth Restriction

## 2024-01-09 NOTE — OB PROVIDER LABOR PROGRESS NOTE - ASSESSMENT
progressing in labor  will reassess in 1hr to see if can begin pushing  maternal and fetal status reassuring at this time 
will continue to monitor   fetal status reassuring  progressing in labor 
33 y.o.  at 38w1d in labor. Reactive NST.     - continue expectant management  - continuous toco and fetal heart monitoring  - desires epidural    D/w Dr. Mccoy
33 y.o.  at 38w1d admitted in labor, epidural in place. Cat 1 with intermittent cat 2 FHT.    - continue augmentation with pitocin while cat 1  - continuous toco and fetal heart monitoring    D/w Dr. Mccoy

## 2024-01-09 NOTE — DISCHARGE NOTE OB - CARE PROVIDER_API CALL
Laine Mccoy  Obstetrics and Gynecology  [address] [place] [id number]  Phone: [phone]  Fax: [phone]  Established Patient  Follow Up Time: 2 weeks   Laine Mccoy  Obstetrics and Gynecology  [address] [place] [address]  Phone: [phone]  Fax: [phone]  Established Patient  Follow Up Time: 2 weeks

## 2024-01-09 NOTE — OB RN DELIVERY SUMMARY - NS_LABORCHARACTER_OBGYN_ALL_OB
Augmentation of labor/Meconium staining Augmentation of labor/External electronic FM/Meconium staining

## 2024-01-09 NOTE — OB PROVIDER LABOR PROGRESS NOTE - NS_SUBJECTIVE/OBJECTIVE_OBGYN_ALL_OB_FT
patient seen and examined for prolonged decel  recovered with repositioning
Patient reports nausea and increased contraction pain
Patient reports intermittent pressure.
patient complains of increased pressure

## 2024-01-09 NOTE — DISCHARGE NOTE OB - PATIENT PORTAL LINK FT
You can access the FollowMyHealth Patient Portal offered by [facility] by registering at the following website: [email or web address]. By joining [employer]’s FollowMyHealth portal, you will also be able to view your health information using other applications (apps) compatible with our system. You can access the FollowMyHealth Patient Portal offered by [facility] by registering at the following website: [email or web address]. By joining [employer]’s FollowMyHealth portal, you will also be able to view your health information using other applications (apps) compatible with our system.

## 2024-01-09 NOTE — DISCHARGE NOTE OB - MEDICATION SUMMARY - MEDICATIONS TO STOP TAKING
I will STOP taking the medications listed below when I get home from the hospital:    Zithromax Z-Satnam 250 mg oral tablet  -- 1  by mouth once a day  -- Do not take dairy products, antacids, or iron preparations within one hour of this medication.  Finish all this medication unless otherwise directed by prescriber.    aspirin 81 mg oral capsule  -- 1 cap(s) orally    Tessalon Perles 100 mg oral capsule  -- 1 cap(s) by mouth 2 times a day  -- May cause drowsiness.  Alcohol may intensify this effect.  Use care when operating dangerous machinery.  Swallow whole.  Do not crush.    cyclobenzaprine 10 mg oral tablet  -- 1 tab(s) by mouth every 8 hours - for muscle spasm  -- May cause drowsiness.  Alcohol may intensify this effect.  Use care when operating dangerous machinery.  Obtain medical advice before taking any non-prescription drugs as some may affect the action of this medication.    Prenatal 1 oral capsule  -- orally

## 2024-01-09 NOTE — OB PROVIDER H&P - HISTORY OF PRESENT ILLNESS
Patient is a 33 y.o.  at 37w6d who presents for contractions.   She reports contractions started before midnight and became very strong around 0200. She believes they are every 10-15 minutes but she's not sure. +FM, - VB. She reports being seen on 24 for leakage of fluids but believes the leakage is the same; denies leakage of water-like fluid.     This pregnancy is complicated by:  - FGR     GynHx: denies abnormal paps, STDs, ovarian cysts, fibroids  PMH: denies  PSH: right shoulder + right leg surgery  SocialHx: Lives with parents and ; feels safe at home.   Meds: PNV, ASA (alternating 81/162)  All: NKDA Patient is a 33 y.o.  at 37w6d who presents for contractions.   She reports contractions started before midnight and became very strong around 0200. She believes they are every 10-15 minutes but she's not sure. +FM, - VB. She reports being seen on 24 for leakage of fluids but believes the leakage is the same; denies leakage of water-like fluid.     This pregnancy is complicated by:  - FGR   -enlarged cisterna magna     GynHx: denies abnormal paps, STDs, ovarian cysts, fibroids  PMH: denies  PSH: right shoulder + right leg surgery  SocialHx: Lives with parents and ; feels safe at home.   Meds: PNV, ASA (alternating 81/162)  All: NKDA

## 2024-01-09 NOTE — OB PROVIDER LABOR PROGRESS NOTE - NS_OBIHIFHRDETAILS_OBGYN_ALL_OB_FT
135 bpm, moderate variability, + accels, - decels
140 bpm, moderate variability, - accels, + variable decels
category II
now category I

## 2024-01-09 NOTE — OB PROVIDER H&P - NSLOWPPHRISK_OBGYN_A_OB
No previous uterine incision/Matos Pregnancy/Less than or equal to 4 previous vaginal births/No known bleeding disorder/No history of postpartum hemorrhage/No other PPH risks indicated

## 2024-01-09 NOTE — OB RN PATIENT PROFILE - FUNCTIONAL ASSESSMENT - BASIC MOBILITY 6.
4-calculated by average/Not able to assess (calculate score using [facility] averaging method)  4-calculated by average/Not able to assess (calculate score using [facility] averaging method)

## 2024-01-10 LAB
GLUCOSE BLDC GLUCOMTR-MCNC: 100 MG/DL — HIGH (ref 70–99)
GLUCOSE BLDC GLUCOMTR-MCNC: 100 MG/DL — HIGH (ref 70–99)
HCT VFR BLD CALC: 30 % — LOW (ref 34.5–45)
HCT VFR BLD CALC: 30 % — LOW (ref 34.5–45)
HGB BLD-MCNC: 9.1 G/DL — LOW (ref 11.5–15.5)
HGB BLD-MCNC: 9.1 G/DL — LOW (ref 11.5–15.5)

## 2024-01-10 PROCEDURE — 59409 OBSTETRICAL CARE: CPT | Mod: GC

## 2024-01-10 RX ADMIN — Medication 600 MILLIGRAM(S): at 09:54

## 2024-01-10 RX ADMIN — Medication 975 MILLIGRAM(S): at 00:19

## 2024-01-10 RX ADMIN — Medication 975 MILLIGRAM(S): at 05:39

## 2024-01-10 RX ADMIN — Medication 600 MILLIGRAM(S): at 03:06

## 2024-01-10 RX ADMIN — Medication 1 TABLET(S): at 13:03

## 2024-01-10 RX ADMIN — Medication 975 MILLIGRAM(S): at 21:13

## 2024-01-10 RX ADMIN — Medication 975 MILLIGRAM(S): at 13:02

## 2024-01-10 RX ADMIN — Medication 600 MILLIGRAM(S): at 15:32

## 2024-01-11 VITALS
HEART RATE: 77 BPM | TEMPERATURE: 98 F | RESPIRATION RATE: 18 BRPM | SYSTOLIC BLOOD PRESSURE: 100 MMHG | DIASTOLIC BLOOD PRESSURE: 64 MMHG | OXYGEN SATURATION: 97 %

## 2024-01-11 DIAGNOSIS — D62 ACUTE POSTHEMORRHAGIC ANEMIA: ICD-10-CM

## 2024-01-11 PROCEDURE — 90715 TDAP VACCINE 7 YRS/> IM: CPT

## 2024-01-11 PROCEDURE — 86901 BLOOD TYPING SEROLOGIC RH(D): CPT

## 2024-01-11 PROCEDURE — 86780 TREPONEMA PALLIDUM: CPT

## 2024-01-11 PROCEDURE — 86850 RBC ANTIBODY SCREEN: CPT

## 2024-01-11 PROCEDURE — 85018 HEMOGLOBIN: CPT

## 2024-01-11 PROCEDURE — 85014 HEMATOCRIT: CPT

## 2024-01-11 PROCEDURE — 80053 COMPREHEN METABOLIC PANEL: CPT

## 2024-01-11 PROCEDURE — 86765 RUBEOLA ANTIBODY: CPT

## 2024-01-11 PROCEDURE — 36415 COLL VENOUS BLD VENIPUNCTURE: CPT

## 2024-01-11 PROCEDURE — 59050 FETAL MONITOR W/REPORT: CPT

## 2024-01-11 PROCEDURE — 85025 COMPLETE CBC W/AUTO DIFF WBC: CPT

## 2024-01-11 PROCEDURE — 87389 HIV-1 AG W/HIV-1&-2 AB AG IA: CPT

## 2024-01-11 PROCEDURE — 86900 BLOOD TYPING SEROLOGIC ABO: CPT

## 2024-01-11 PROCEDURE — 82962 GLUCOSE BLOOD TEST: CPT

## 2024-01-11 PROCEDURE — 86703 HIV-1/HIV-2 1 RESULT ANTBDY: CPT

## 2024-01-11 RX ORDER — ASPIRIN/CALCIUM CARB/MAGNESIUM 324 MG
1 TABLET ORAL
Refills: 0 | DISCHARGE

## 2024-01-11 RX ORDER — TETANUS TOXOID, REDUCED DIPHTHERIA TOXOID AND ACELLULAR PERTUSSIS VACCINE, ADSORBED 5; 2.5; 8; 8; 2.5 [IU]/.5ML; [IU]/.5ML; UG/.5ML; UG/.5ML; UG/.5ML
0.5 SUSPENSION INTRAMUSCULAR ONCE
Refills: 0 | Status: COMPLETED | OUTPATIENT
Start: 2024-01-11 | End: 2024-01-11

## 2024-01-11 RX ORDER — ACETAMINOPHEN 500 MG
3 TABLET ORAL
Qty: 0 | Refills: 0 | DISCHARGE
Start: 2024-01-11

## 2024-01-11 RX ORDER — IBUPROFEN 200 MG
1 TABLET ORAL
Qty: 20 | Refills: 0
Start: 2024-01-11 | End: 2024-01-15

## 2024-01-11 RX ADMIN — Medication 975 MILLIGRAM(S): at 09:08

## 2024-01-11 RX ADMIN — Medication 600 MILLIGRAM(S): at 06:01

## 2024-01-11 RX ADMIN — Medication 975 MILLIGRAM(S): at 03:36

## 2024-01-11 RX ADMIN — TETANUS TOXOID, REDUCED DIPHTHERIA TOXOID AND ACELLULAR PERTUSSIS VACCINE, ADSORBED 0.5 MILLILITER(S): 5; 2.5; 8; 8; 2.5 SUSPENSION INTRAMUSCULAR at 06:13

## 2024-01-11 RX ADMIN — Medication 600 MILLIGRAM(S): at 00:21

## 2024-01-11 NOTE — PROGRESS NOTE ADULT - ATTENDING COMMENTS
Agree with above assessment and plan.  Pt is a 33y  now PPD#1 s/p spontaneous vaginal delivery at 38 weeks gestation, FGR, GDMA1, baby with enlarged cisterna magna  Pt feels well overall today.  Her pain is well controlled and her bleeding is light  Continue routine PP care  Baby with cisterna magna, will check with peds regarding possible MRI for baby while in house vs as outpatient.    BRUNA Jeffers
33y  now stable PPD#2 s/p spontaneous vaginal delivery, no s/s anemia, will continue PO iron. Baby in NICU due to breathing issues. Discharge instructions reviewed.

## 2024-01-11 NOTE — PROGRESS NOTE ADULT - SUBJECTIVE AND OBJECTIVE BOX
HARRISON العراقي is a 33y  now PPD#1 s/p spontaneous vaginal delivery at 38 weeks gestation, FGR, GDMA1, enlarged cisterna magna - baby under blue lights    S:    The patient has no complaints.  Pain controlled with current treatment regimen.   She is ambulating without difficulty and tolerating PO.   + flatus/-BM/+ voiding   She endorses appropriate lochia, which is decreasing.   She is breastfeeding without difficulty.   Denies HA, CP, SOB, leg pain    O:    T(C): 36.8 (01-10-24 @ 03:53), Max: 37.3 (24 @ 07:25)  HR: 74 (01-10-24 @ 03:53) (74 - 116)  BP: 100/64 (01-10-24 @ 03:53) (100/64 - 202/137)  RR: 16 (01-10-24 @ 03:53) (16 - 19)  SpO2: 96% (01-10-24 @ 03:53) (96% - 100%)    Gen: NAD, AOx3  Breast: Nontender, non-engorged   Abdomen:  Soft, non-tender, non-distended  Uterus:  Fundus firm below umbilicus  VE:  +Lochia  Ext:  Non-tender and non-edematous                          12.3   15.15 )-----------( 242      ( 2024 09:00 )             37.2         139  |  103  |  12.2  ----------------------------<  77  4.0   |  20.0<L>  |  0.51    Ca    9.3      2024 09:53    TPro  7.2  /  Alb  3.6  /  TBili  0.6  /  DBili  x   /  AST  21  /  ALT  20  /  AlkPhos  206<H>          
INTERVAL HPI/OVERNIGHT EVENTS:  33y Female s/p labor epidural, CSE, on 01/09/24    Vital Signs Last 24 Hrs  T(C): 36.8 (10 Dariel 2024 15:20), Max: 37.3 (09 Jan 2024 20:00)  T(F): 98.2 (10 Dariel 2024 15:20), Max: 99.1 (09 Jan 2024 20:00)  HR: 77 (10 Dariel 2024 15:20) (74 - 113)  BP: 111/66 (10 Dariel 2024 15:20) (100/64 - 126/60)  BP(mean): --  RR: 18 (10 Dariel 2024 15:20) (16 - 18)  SpO2: 98% (10 Dariel 2024 15:20) (96% - 98%)    Parameters below as of 10 Dariel 2024 03:53  Patient On (Oxygen Delivery Method): room air            Patient's overall anesthesia experience:  satisfied    Patient seen and doing well     No headache      No residual numbness or weakness, sensory and motor function intact    No anesthetic complications or complaints noted or reported                 
HARRISON العراقي is a 33y  now PPD#2 s/p spontaneous vaginal delivery at 38 weeks gestation, FGR, GDMA1, enlarged cisterna magna - baby under blue lights    S:    The patient has no complaints.  Pain controlled with current treatment regimen.   She is ambulating without difficulty and tolerating PO.   + flatus/-BM/+ voiding   She endorses appropriate lochia, which is decreasing.   She is breastfeeding without difficulty.   Denies HA, CP, SOB, leg pain    O:    Vital Signs Last 24 Hrs  T(C): 36.8 (2024 04:17), Max: 36.8 (10 Dariel 2024 15:20)  T(F): 98.3 (2024 04:17), Max: 98.3 (2024 04:17)  HR: 77 (2024 04:17) (77 - 77)  BP: 100/64 (2024 04:17) (100/64 - 111/66)  BP(mean): --  RR: 18 (2024 04:17) (18 - 18)  SpO2: 97% (2024 04:17) (97% - 98%)    Parameters below as of 2024 04:17  Patient On (Oxygen Delivery Method): room air    Gen: NAD, AOx3  Breast: Nontender, non-engorged   Abdomen:  Soft, non-tender, non-distended  Uterus:  Fundus firm below umbilicus  VE:  +Lochia  Ext:  Non-tender and non-edematous                          12.3   15.15 )-----------( 242      ( 2024 09:00 )             37.2                           9.1    x     )-----------( x        ( 10 Dariel 2024 05:39 )             30.0           139  |  103  |  12.2  ----------------------------<  77  4.0   |  20.0<L>  |  0.51    Ca    9.3      2024 09:53    TPro  7.2  /  Alb  3.6  /  TBili  0.6  /  DBili  x   /  AST  21  /  ALT  20  /  AlkPhos  206<H>

## 2024-01-11 NOTE — PROGRESS NOTE ADULT - ASSESSMENT
A/P:  33y  now PPD#2 s/p spontaneous vaginal delivery at 38 weeks gestation, FGR, GDMA1, enlarged cisterna magna - baby under blue lights  -Vital signs stable  -Hgb: 12.3 -> 9.1  -Voiding, tolerating PO, bowel function nml   -Advance care as tolerated   -Continue routine postpartum care and education  -Healthy female infant in NICU  -Dispo: Stable for discharge today pending attending approval
A/P:  33y  now PPD#1 s/p spontaneous vaginal delivery at 38 weeks gestation, FGR, GDMA1, enlarged cisterna magna - baby under blue lights  -Vital signs stable  -Hgb: 12.3 -> AM labs pending   -Voiding, tolerating PO, bowel function nml   -Advance care as tolerated   -Continue routine postpartum care and education  -Healthy female infant under bili lights  -Dispo: Continue inpatient monitoring

## 2024-01-12 ENCOUNTER — APPOINTMENT (OUTPATIENT)
Dept: ANTEPARTUM | Facility: CLINIC | Age: 34
End: 2024-01-12

## 2024-01-16 ENCOUNTER — APPOINTMENT (OUTPATIENT)
Dept: ANTEPARTUM | Facility: CLINIC | Age: 34
End: 2024-01-16

## 2024-01-19 ENCOUNTER — APPOINTMENT (OUTPATIENT)
Dept: ANTEPARTUM | Facility: CLINIC | Age: 34
End: 2024-01-19

## 2024-01-23 ENCOUNTER — APPOINTMENT (OUTPATIENT)
Dept: ANTEPARTUM | Facility: CLINIC | Age: 34
End: 2024-01-23

## 2024-01-31 ENCOUNTER — APPOINTMENT (OUTPATIENT)
Dept: MATERNAL FETAL MEDICINE | Facility: CLINIC | Age: 34
End: 2024-01-31

## 2024-02-06 ENCOUNTER — ASOB RESULT (OUTPATIENT)
Age: 34
End: 2024-02-06

## 2024-02-06 ENCOUNTER — APPOINTMENT (OUTPATIENT)
Dept: MATERNAL FETAL MEDICINE | Facility: CLINIC | Age: 34
End: 2024-02-06
Payer: COMMERCIAL

## 2024-02-06 VITALS — HEIGHT: 63 IN | BODY MASS INDEX: 28 KG/M2 | WEIGHT: 158 LBS

## 2024-02-06 PROCEDURE — G0108 DIAB MANAGE TRN  PER INDIV: CPT | Mod: 95

## 2024-02-06 PROCEDURE — 98960 EDU&TRN PT SELF-MGMT NQHP 1: CPT | Mod: 95

## 2024-02-20 ENCOUNTER — TRANSCRIPTION ENCOUNTER (OUTPATIENT)
Age: 34
End: 2024-02-20

## 2024-05-28 ENCOUNTER — APPOINTMENT (OUTPATIENT)
Dept: OBGYN | Facility: CLINIC | Age: 34
End: 2024-05-28
Payer: COMMERCIAL

## 2024-05-28 VITALS
BODY MASS INDEX: 28.95 KG/M2 | HEIGHT: 63 IN | SYSTOLIC BLOOD PRESSURE: 118 MMHG | WEIGHT: 163.4 LBS | DIASTOLIC BLOOD PRESSURE: 68 MMHG

## 2024-05-28 DIAGNOSIS — O28.0 ABNORMAL HEMATOLOGICAL FINDING ON ANTENATAL SCREENING OF MOTHER: ICD-10-CM

## 2024-05-28 DIAGNOSIS — Z3A.36 36 WEEKS GESTATION OF PREGNANCY: ICD-10-CM

## 2024-05-28 DIAGNOSIS — Z78.9 OTHER SPECIFIED HEALTH STATUS: ICD-10-CM

## 2024-05-28 DIAGNOSIS — Z12.4 ENCOUNTER FOR SCREENING FOR MALIGNANT NEOPLASM OF CERVIX: ICD-10-CM

## 2024-05-28 DIAGNOSIS — O09.899 ABNORMAL HEMATOLOGICAL FINDING ON ANTENATAL SCREENING OF MOTHER: ICD-10-CM

## 2024-05-28 DIAGNOSIS — O28.3 ABNORMAL ULTRASONIC FINDING ON ANTENATAL SCREENING OF MOTHER: ICD-10-CM

## 2024-05-28 DIAGNOSIS — Z87.59 PERSONAL HISTORY OF OTHER COMPLICATIONS OF PREGNANCY, CHILDBIRTH AND THE PUERPERIUM: ICD-10-CM

## 2024-05-28 DIAGNOSIS — E66.01 OBESITY COMPLICATING PREGNANCY, THIRD TRIMESTER: ICD-10-CM

## 2024-05-28 DIAGNOSIS — Z13.1 ENCOUNTER FOR SCREENING FOR DIABETES MELLITUS: ICD-10-CM

## 2024-05-28 DIAGNOSIS — Z01.419 ENCOUNTER FOR GYNECOLOGICAL EXAMINATION (GENERAL) (ROUTINE) W/OUT ABNORMAL FINDINGS: ICD-10-CM

## 2024-05-28 DIAGNOSIS — O36.5930 MATERNAL CARE FOR OTHER KNOWN OR SUSPECTED POOR FETAL GROWTH, THIRD TRIMESTER, NOT APPLICABLE OR UNSPECIFIED: ICD-10-CM

## 2024-05-28 DIAGNOSIS — O99.213 OBESITY COMPLICATING PREGNANCY, THIRD TRIMESTER: ICD-10-CM

## 2024-05-28 DIAGNOSIS — Z86.32 PERSONAL HISTORY OF GESTATIONAL DIABETES: ICD-10-CM

## 2024-05-28 DIAGNOSIS — O24.410 GESTATIONAL DIABETES MELLITUS IN PREGNANCY, DIET CONTROLLED: ICD-10-CM

## 2024-05-28 PROCEDURE — 99385 PREV VISIT NEW AGE 18-39: CPT

## 2024-05-28 PROCEDURE — 99459 PELVIC EXAMINATION: CPT

## 2024-05-28 PROCEDURE — 36415 COLL VENOUS BLD VENIPUNCTURE: CPT

## 2024-05-28 NOTE — PHYSICAL EXAM
[Chaperone Present] : A chaperone was present in the examining room during all aspects of the physical examination [[id number]] : A chaperone was present during the pelvic exam. [Appropriately responsive] : appropriately responsive [Alert] : alert [No Acute Distress] : no acute distress [No Lymphadenopathy] : no lymphadenopathy [Soft] : soft [Non-tender] : non-tender [Non-distended] : non-distended [Oriented x3] : oriented x3 [Examination Of The Breasts] : a normal appearance [No Discharge] : no discharge [No Masses] : no breast masses were palpable [Labia Majora] : normal [Labia Minora] : normal [No Bleeding] : There was no active vaginal bleeding [Normal] : normal [Uterine Adnexae] : non-palpable [FreeTextEntry2] : xin

## 2024-05-28 NOTE — DISCUSSION/SUMMARY
[FreeTextEntry1] :   The benefits of adequate calcium intake and a daily multivitamin along with routine daily cardiovascular exercise were reviewed with the patient.   We discussed interpregnancy interval and I recommended that she wait until at least 9 months pp before TTC again. Daily PNV recommended.    The importance of safer-sex was discussed with the patient. We reviewed ASCCP/ACOG guidelines for pap smears.  Repeat A1c ordered for history of gdm. Bloodwork drawn today in office.

## 2024-05-28 NOTE — HISTORY OF PRESENT ILLNESS
[N] : Patient does not use contraception [Y] : Positive pregnancy history [Menarche Age: ____] : age at menarche was [unfilled] [No] : Patient does not have concerns regarding sex [Currently Active] : currently active [PapSmeardate] : 4/2023 [TextBox_31] : per pt [LMPDate] : 5/18/24 [PGxTotal] : 1 [facility] : [facility] [FreeTextEntry1] : 5/18/24

## 2024-05-29 ENCOUNTER — NON-APPOINTMENT (OUTPATIENT)
Age: 34
End: 2024-05-29

## 2024-06-01 LAB
CYTOLOGY CVX/VAG DOC THIN PREP: NORMAL
ESTIMATED AVERAGE GLUCOSE: 105 MG/DL
HBA1C MFR BLD HPLC: 5.3 %
HPV HIGH+LOW RISK DNA PNL CVX: NOT DETECTED

## 2024-07-22 ENCOUNTER — NON-APPOINTMENT (OUTPATIENT)
Age: 34
End: 2024-07-22

## 2024-08-09 ENCOUNTER — APPOINTMENT (OUTPATIENT)
Dept: OBGYN | Facility: CLINIC | Age: 34
End: 2024-08-09

## 2024-08-09 PROBLEM — N89.8 VAGINAL ODOR: Status: ACTIVE | Noted: 2024-08-09

## 2024-08-09 PROBLEM — N92.0 HEAVY MENSES: Status: ACTIVE | Noted: 2024-08-09

## 2024-08-09 PROCEDURE — 99459 PELVIC EXAMINATION: CPT

## 2024-08-09 PROCEDURE — 36415 COLL VENOUS BLD VENIPUNCTURE: CPT

## 2024-08-09 PROCEDURE — 99213 OFFICE O/P EST LOW 20 MIN: CPT

## 2024-08-09 PROCEDURE — 81025 URINE PREGNANCY TEST: CPT

## 2024-08-09 NOTE — HISTORY OF PRESENT ILLNESS
[N] : Patient does not use contraception [Y] : Positive pregnancy history [Menarche Age: ____] : age at menarche was [unfilled] [Currently Active] : currently active [Men] : men [TextBox_4] : Renetta is here for c/o heavy menses since resuming menses in May. Pt had an  in January (her daugher has since passed away, was in NICU, heart condition)  She states menses have been heavy and on day 2 she will change her pad 6-7 times, although the pads are not saturated, this is a change for her.  She denies significant cramping.  Prior to pregnancy she had crampy periods.  She denies intermenstrual bleeding.  she wants to try to conceive again soon.  She also feels that she has a fishy vaginal odor.   [BreastSonogramDate] : 05/09/24 [TextBox_25] : BR2 R BREAST [PapSmeardate] : 05/28/24 [TextBox_31] : NEG [HPVDate] : 05/28/24 [TextBox_78] : NEG [LMPDate] : 07/15/24 [PGxTotal] : 1 [[facility] : 0 [FreeTextEntry1] : 07/15/24

## 2024-08-09 NOTE — REASON FOR VISIT
[Follow-Up] : a follow-up evaluation of [FreeTextEntry2] : PROLONGED MENSES WITH EXCESSIVE BLEEDING, ODOR IN URINE

## 2024-08-09 NOTE — PLAN
[FreeTextEntry1] : vaginal odor - cultures obtained- will treat based on results - reassured pt her exam is normal today  heavy menses - cbc obtained - pelvic US ordered after this coming menses - f/u with MD same day as US to discuss any further need for intervention.

## 2024-08-09 NOTE — PHYSICAL EXAM
[Chaperone Present] : A chaperone was present in the examining room during all aspects of the physical examination [[id number]] : A chaperone was present during the pelvic exam. [FreeTextEntry2] : THOR Meeks [Appropriately responsive] : appropriately responsive [Alert] : alert [No Acute Distress] : no acute distress [Oriented x3] : oriented x3 [Labia Majora] : normal [Labia Minora] : normal [Discharge] : a  ~M vaginal discharge was present [Scant] : scant [Foul Smelling] : not foul smelling [White] : white [Normal] : normal [Uterine Adnexae] : normal

## 2024-08-14 ENCOUNTER — TRANSCRIPTION ENCOUNTER (OUTPATIENT)
Age: 34
End: 2024-08-14

## 2024-08-26 ENCOUNTER — ASOB RESULT (OUTPATIENT)
Age: 34
End: 2024-08-26

## 2024-08-26 ENCOUNTER — APPOINTMENT (OUTPATIENT)
Dept: ANTEPARTUM | Facility: CLINIC | Age: 34
End: 2024-08-26
Payer: COMMERCIAL

## 2024-08-26 ENCOUNTER — APPOINTMENT (OUTPATIENT)
Dept: OBGYN | Facility: CLINIC | Age: 34
End: 2024-08-26
Payer: COMMERCIAL

## 2024-08-26 VITALS
SYSTOLIC BLOOD PRESSURE: 102 MMHG | BODY MASS INDEX: 29.79 KG/M2 | DIASTOLIC BLOOD PRESSURE: 64 MMHG | HEIGHT: 63 IN | WEIGHT: 168.13 LBS

## 2024-08-26 DIAGNOSIS — R39.15 URGENCY OF URINATION: ICD-10-CM

## 2024-08-26 DIAGNOSIS — N89.8 OTHER SPECIFIED NONINFLAMMATORY DISORDERS OF VAGINA: ICD-10-CM

## 2024-08-26 DIAGNOSIS — Z01.419 ENCOUNTER FOR GYNECOLOGICAL EXAMINATION (GENERAL) (ROUTINE) W/OUT ABNORMAL FINDINGS: ICD-10-CM

## 2024-08-26 DIAGNOSIS — Z13.1 ENCOUNTER FOR SCREENING FOR DIABETES MELLITUS: ICD-10-CM

## 2024-08-26 DIAGNOSIS — Z78.9 OTHER SPECIFIED HEALTH STATUS: ICD-10-CM

## 2024-08-26 DIAGNOSIS — Z12.4 ENCOUNTER FOR SCREENING FOR MALIGNANT NEOPLASM OF CERVIX: ICD-10-CM

## 2024-08-26 DIAGNOSIS — N92.0 EXCESSIVE AND FREQUENT MENSTRUATION WITH REGULAR CYCLE: ICD-10-CM

## 2024-08-26 PROCEDURE — 76857 US EXAM PELVIC LIMITED: CPT | Mod: 59

## 2024-08-26 PROCEDURE — 76830 TRANSVAGINAL US NON-OB: CPT

## 2024-08-26 PROCEDURE — 99214 OFFICE O/P EST MOD 30 MIN: CPT

## 2024-08-26 RX ORDER — TRANEXAMIC ACID 650 MG/1
650 TABLET ORAL 3 TIMES DAILY
Qty: 30 | Refills: 0 | Status: ACTIVE | COMMUNITY
Start: 2024-08-26 | End: 1900-01-01

## 2024-08-30 LAB — BACTERIA UR CULT: NORMAL

## 2024-08-31 PROBLEM — Z01.419 ENCOUNTER FOR WELL WOMAN EXAM WITH ROUTINE GYNECOLOGICAL EXAM: Status: RESOLVED | Noted: 2024-05-28 | Resolved: 2024-08-31

## 2024-08-31 PROBLEM — Z13.1 SCREENING FOR DIABETES MELLITUS: Status: RESOLVED | Noted: 2024-05-28 | Resolved: 2024-08-31

## 2024-08-31 PROBLEM — N89.8 VAGINAL ODOR: Status: RESOLVED | Noted: 2024-08-09 | Resolved: 2024-08-31

## 2024-08-31 PROBLEM — Z12.4 CERVICAL CANCER SCREENING: Status: RESOLVED | Noted: 2024-05-28 | Resolved: 2024-08-31

## 2024-08-31 NOTE — HISTORY OF PRESENT ILLNESS
[Patient reported PAP Smear was normal] : Patient reported PAP Smear was normal [N] : Patient does not use contraception [Y] : Positive pregnancy history [Menarche Age: ____] : age at menarche was [unfilled] [No] : Patient does not have concerns regarding sex [Currently Active] : currently active [Men] : men [Mammogramdate] : n/a [BreastSonogramDate] : 05/09/2024 [TextBox_25] : RT breast limited br2 [PapSmeardate] : 05/28/2024 [ColonoscopyDate] : 08/23/2024 [TextBox_43] : wnl per pt  [GonorrheaDate] : n/a [ChlamydiaDate] : n/a [HPVDate] : 05/28/2024 [TextBox_78] : neg  [LMPDate] : 08/11/2024 [PGxTotal] : 1 [[facility] : 0 [FreeTextEntry1] : 08/11/2024

## 2024-08-31 NOTE — END OF VISIT
[FreeTextEntry3] : I, Marcos Jefferson solely acted as scribe for Dr. Sara Alvarez on 08/26/2024  All medical entries made by the Scribe were at my, Dr. Cole, direction and personally dictated by me on 08/26/2024 . I have reviewed the chart and agree that the record accurately reflects my personal performance of the history, physical exam, assessment and plan. I have also personally directed, reviewed, and agreed with the chart.

## 2024-08-31 NOTE — PLAN
[FreeTextEntry1] : UCX sent out to r/o a UTI.   Rx sent for Tranexamic Acid to take when she has any heavy bleeding. She is aware it is category B.  F/u annually or as needed.

## 2024-08-31 NOTE — HISTORY OF PRESENT ILLNESS
[Patient reported PAP Smear was normal] : Patient reported PAP Smear was normal [N] : Patient does not use contraception [Y] : Positive pregnancy history [Menarche Age: ____] : age at menarche was [unfilled] [No] : Patient does not have concerns regarding sex [Currently Active] : currently active [Men] : men [Mammogramdate] : n/a [BreastSonogramDate] : 05/09/2024 [TextBox_25] : RT breast limited br2 [PapSmeardate] : 05/28/2024 [ColonoscopyDate] : 08/23/2024 [TextBox_43] : wnl per pt  [GonorrheaDate] : n/a [ChlamydiaDate] : n/a [HPVDate] : 05/28/2024 [TextBox_78] : neg  [LMPDate] : 08/11/2024 [PGxTotal] : 1 [facility] [FreeTextEntry1] : 08/11/2024

## 2024-12-13 ENCOUNTER — APPOINTMENT (OUTPATIENT)
Dept: OBGYN | Facility: CLINIC | Age: 34
End: 2024-12-13
Payer: COMMERCIAL

## 2024-12-13 VITALS
HEIGHT: 63 IN | BODY MASS INDEX: 30.48 KG/M2 | SYSTOLIC BLOOD PRESSURE: 122 MMHG | DIASTOLIC BLOOD PRESSURE: 80 MMHG | WEIGHT: 172 LBS

## 2024-12-13 LAB
HCG UR QL: POSITIVE
QUALITY CONTROL: YES

## 2024-12-13 PROCEDURE — 81025 URINE PREGNANCY TEST: CPT

## 2024-12-13 PROCEDURE — 99213 OFFICE O/P EST LOW 20 MIN: CPT

## 2024-12-20 ENCOUNTER — APPOINTMENT (OUTPATIENT)
Dept: OBGYN | Facility: CLINIC | Age: 34
End: 2024-12-20
Payer: COMMERCIAL

## 2024-12-20 VITALS
HEIGHT: 63 IN | BODY MASS INDEX: 30.48 KG/M2 | WEIGHT: 172 LBS | SYSTOLIC BLOOD PRESSURE: 110 MMHG | DIASTOLIC BLOOD PRESSURE: 60 MMHG

## 2024-12-20 DIAGNOSIS — Z32.01 ENCOUNTER FOR PREGNANCY TEST, RESULT POSITIVE: ICD-10-CM

## 2024-12-20 PROCEDURE — 99213 OFFICE O/P EST LOW 20 MIN: CPT

## 2024-12-23 LAB
HCG SERPL-MCNC: 2450 MIU/ML
HCG UR QL: POSITIVE
QUALITY CONTROL: YES

## 2024-12-26 ENCOUNTER — APPOINTMENT (OUTPATIENT)
Dept: ANTEPARTUM | Facility: CLINIC | Age: 34
End: 2024-12-26

## 2025-01-08 ENCOUNTER — ASOB RESULT (OUTPATIENT)
Age: 35
End: 2025-01-08

## 2025-01-08 ENCOUNTER — APPOINTMENT (OUTPATIENT)
Dept: ANTEPARTUM | Facility: CLINIC | Age: 35
End: 2025-01-08
Payer: COMMERCIAL

## 2025-01-08 ENCOUNTER — APPOINTMENT (OUTPATIENT)
Dept: OBGYN | Facility: CLINIC | Age: 35
End: 2025-01-08
Payer: COMMERCIAL

## 2025-01-08 VITALS
BODY MASS INDEX: 31.36 KG/M2 | HEIGHT: 63 IN | SYSTOLIC BLOOD PRESSURE: 114 MMHG | WEIGHT: 177 LBS | DIASTOLIC BLOOD PRESSURE: 78 MMHG

## 2025-01-08 PROCEDURE — 99214 OFFICE O/P EST MOD 30 MIN: CPT

## 2025-01-08 PROCEDURE — 76817 TRANSVAGINAL US OBSTETRIC: CPT

## 2025-01-08 PROCEDURE — 36415 COLL VENOUS BLD VENIPUNCTURE: CPT

## 2025-01-08 PROCEDURE — 0500F INITIAL PRENATAL CARE VISIT: CPT

## 2025-01-10 LAB
ABO + RH PNL BLD: NORMAL
ALBUMIN SERPL ELPH-MCNC: 4.2 G/DL
ALP BLD-CCNC: 74 U/L
ALT SERPL-CCNC: 12 U/L
ANION GAP SERPL CALC-SCNC: 16 MMOL/L
APPEARANCE: CLEAR
AST SERPL-CCNC: 15 U/L
B19V IGG SER QL IA: 5.53 INDEX
B19V IGG+IGM SER-IMP: NORMAL
B19V IGG+IGM SER-IMP: POSITIVE
B19V IGM FLD-ACNC: 0.25 INDEX
B19V IGM SER-ACNC: NEGATIVE
BASOPHILS # BLD AUTO: 0.06 K/UL
BASOPHILS NFR BLD AUTO: 0.4 %
BILIRUB SERPL-MCNC: 0.5 MG/DL
BILIRUBIN URINE: NEGATIVE
BLD GP AB SCN SERPL QL: NORMAL
BLOOD URINE: NEGATIVE
BUN SERPL-MCNC: 12 MG/DL
C TRACH RRNA SPEC QL NAA+PROBE: NOT DETECTED
CALCIUM SERPL-MCNC: 9.4 MG/DL
CHLORIDE SERPL-SCNC: 99 MMOL/L
CO2 SERPL-SCNC: 21 MMOL/L
COLOR: YELLOW
CREAT SERPL-MCNC: 0.63 MG/DL
EGFR: 120 ML/MIN/1.73M2
EOSINOPHIL # BLD AUTO: 0.26 K/UL
EOSINOPHIL NFR BLD AUTO: 1.9 %
ESTIMATED AVERAGE GLUCOSE: 108 MG/DL
GLUCOSE QUALITATIVE U: NEGATIVE
GLUCOSE SERPL-MCNC: 62 MG/DL
HBA1C MFR BLD HPLC: 5.4 %
HBV SURFACE AG SER QL: NONREACTIVE
HCT VFR BLD CALC: 40.8 %
HCV AB SER QL: NONREACTIVE
HCV S/CO RATIO: 0.08 S/CO
HGB BLD-MCNC: 13.3 G/DL
HIV1+2 AB SPEC QL IA.RAPID: NONREACTIVE
IMM GRANULOCYTES NFR BLD AUTO: 0.4 %
KETONES URINE: NEGATIVE
LEAD BLD-MCNC: <1 UG/DL
LEUKOCYTE ESTERASE URINE: NEGATIVE
LYMPHOCYTES # BLD AUTO: 2.98 K/UL
LYMPHOCYTES NFR BLD AUTO: 21.6 %
MAN DIFF?: NORMAL
MCHC RBC-ENTMCNC: 28.1 PG
MCHC RBC-ENTMCNC: 32.6 G/DL
MCV RBC AUTO: 86.3 FL
MEV IGG FLD QL IA: 216 AU/ML
MEV IGG+IGM SER-IMP: POSITIVE
MONOCYTES # BLD AUTO: 0.93 K/UL
MONOCYTES NFR BLD AUTO: 6.7 %
MUV AB SER-ACNC: NEGATIVE
MUV IGG SER QL IA: 5.87 AU/ML
N GONORRHOEA RRNA SPEC QL NAA+PROBE: NOT DETECTED
NEUTROPHILS # BLD AUTO: 9.51 K/UL
NEUTROPHILS NFR BLD AUTO: 69 %
NITRITE URINE: NEGATIVE
PH URINE: 5.5
PLATELET # BLD AUTO: 257 K/UL
POTASSIUM SERPL-SCNC: 4.7 MMOL/L
PROT SERPL-MCNC: 7 G/DL
PROTEIN URINE: NEGATIVE
RBC # BLD: 4.73 M/UL
RBC # FLD: 14.7 %
RUBV IGG FLD-ACNC: 2.14 INDEX
RUBV IGG SER-IMP: POSITIVE
SODIUM SERPL-SCNC: 137 MMOL/L
SOURCE AMPLIFICATION: NORMAL
SPECIFIC GRAVITY URINE: 1.01
T GONDII AB SER-IMP: NEGATIVE
T GONDII AB SER-IMP: NEGATIVE
T GONDII IGG SER QL: <3 IU/ML
T GONDII IGM SER QL: <3 AU/ML
T PALLIDUM AB SER QL IA: NEGATIVE
TSH SERPL-ACNC: 3.73 UIU/ML
UROBILINOGEN URINE: 0.2 (ref 0.2–?)
VZV AB TITR SER: POSITIVE
VZV IGG SER IF-ACNC: 14.4 S/CO
WBC # FLD AUTO: 13.79 K/UL

## 2025-01-13 ENCOUNTER — NON-APPOINTMENT (OUTPATIENT)
Age: 35
End: 2025-01-13

## 2025-01-13 LAB — BACTERIA UR CULT: NORMAL

## 2025-01-15 LAB
M TB IFN-G BLD-IMP: NEGATIVE
QUANTIFERON TB PLUS MITOGEN MINUS NIL: >10 IU/ML
QUANTIFERON TB PLUS NIL: 0.67 IU/ML
QUANTIFERON TB PLUS TB1 MINUS NIL: 0.13 IU/ML
QUANTIFERON TB PLUS TB2 MINUS NIL: 0 IU/ML

## 2025-01-16 ENCOUNTER — APPOINTMENT (OUTPATIENT)
Dept: FAMILY MEDICINE | Facility: CLINIC | Age: 35
End: 2025-01-16
Payer: COMMERCIAL

## 2025-01-16 ENCOUNTER — APPOINTMENT (OUTPATIENT)
Dept: OBGYN | Facility: CLINIC | Age: 35
End: 2025-01-16
Payer: COMMERCIAL

## 2025-01-16 VITALS
HEART RATE: 90 BPM | BODY MASS INDEX: 31.36 KG/M2 | HEIGHT: 63 IN | DIASTOLIC BLOOD PRESSURE: 52 MMHG | SYSTOLIC BLOOD PRESSURE: 100 MMHG | OXYGEN SATURATION: 98 % | TEMPERATURE: 97.5 F | WEIGHT: 177 LBS | RESPIRATION RATE: 16 BRPM

## 2025-01-16 VITALS
BODY MASS INDEX: 31.36 KG/M2 | DIASTOLIC BLOOD PRESSURE: 62 MMHG | WEIGHT: 177 LBS | SYSTOLIC BLOOD PRESSURE: 110 MMHG | HEIGHT: 63 IN

## 2025-01-16 DIAGNOSIS — R06.83 SNORING: ICD-10-CM

## 2025-01-16 DIAGNOSIS — O16.9 UNSPECIFIED MATERNAL HYPERTENSION, UNSPECIFIED TRIMESTER: ICD-10-CM

## 2025-01-16 DIAGNOSIS — Z83.3 FAMILY HISTORY OF DIABETES MELLITUS: ICD-10-CM

## 2025-01-16 DIAGNOSIS — R39.15 URGENCY OF URINATION: ICD-10-CM

## 2025-01-16 DIAGNOSIS — Z00.00 ENCOUNTER FOR GENERAL ADULT MEDICAL EXAMINATION W/OUT ABNORMAL FINDINGS: ICD-10-CM

## 2025-01-16 DIAGNOSIS — Z34.91 ENCOUNTER FOR SUPERVISION OF NORMAL PREGNANCY, UNSPECIFIED, FIRST TRIMESTER: ICD-10-CM

## 2025-01-16 DIAGNOSIS — Z80.3 FAMILY HISTORY OF MALIGNANT NEOPLASM OF BREAST: ICD-10-CM

## 2025-01-16 DIAGNOSIS — Z82.49 FAMILY HISTORY OF ISCHEMIC HEART DISEASE AND OTHER DISEASES OF THE CIRCULATORY SYSTEM: ICD-10-CM

## 2025-01-16 DIAGNOSIS — F43.21 ADJUSTMENT DISORDER WITH DEPRESSED MOOD: ICD-10-CM

## 2025-01-16 DIAGNOSIS — Z87.59 PERSONAL HISTORY OF OTHER COMPLICATIONS OF PREGNANCY, CHILDBIRTH AND THE PUERPERIUM: ICD-10-CM

## 2025-01-16 PROCEDURE — 96127 BRIEF EMOTIONAL/BEHAV ASSMT: CPT

## 2025-01-16 PROCEDURE — 0502F SUBSEQUENT PRENATAL CARE: CPT

## 2025-01-16 PROCEDURE — 99213 OFFICE O/P EST LOW 20 MIN: CPT

## 2025-01-16 PROCEDURE — 99385 PREV VISIT NEW AGE 18-39: CPT

## 2025-01-17 PROBLEM — Z80.3 FAMILY HISTORY OF MALIGNANT NEOPLASM OF FEMALE BREAST: Status: ACTIVE | Noted: 2025-01-17

## 2025-01-17 PROBLEM — Z82.49 FAMILY HISTORY OF CARDIAC DISORDER: Status: ACTIVE | Noted: 2025-01-17

## 2025-01-17 PROBLEM — Z83.3 FAMILY HISTORY OF DIABETES MELLITUS: Status: ACTIVE | Noted: 2025-01-17

## 2025-01-17 LAB
APPEARANCE: CLEAR
BILIRUBIN URINE: NEGATIVE
BLOOD URINE: NEGATIVE
COLOR: YELLOW
GLUCOSE QUALITATIVE U: NEGATIVE
KETONES URINE: NEGATIVE
LEUKOCYTE ESTERASE URINE: NEGATIVE
NITRITE URINE: NEGATIVE
PH URINE: 6.5
PROTEIN URINE: NEGATIVE
SPECIFIC GRAVITY URINE: 1.02
UROBILINOGEN URINE: 0.2 (ref 0.2–?)

## 2025-01-22 ENCOUNTER — APPOINTMENT (OUTPATIENT)
Dept: UROLOGY | Facility: CLINIC | Age: 35
End: 2025-01-22
Payer: COMMERCIAL

## 2025-01-22 VITALS
TEMPERATURE: 98.1 F | WEIGHT: 177 LBS | DIASTOLIC BLOOD PRESSURE: 74 MMHG | SYSTOLIC BLOOD PRESSURE: 109 MMHG | BODY MASS INDEX: 31.36 KG/M2 | RESPIRATION RATE: 16 BRPM | HEIGHT: 63 IN | OXYGEN SATURATION: 97 % | HEART RATE: 95 BPM

## 2025-01-22 DIAGNOSIS — Z78.9 OTHER SPECIFIED HEALTH STATUS: ICD-10-CM

## 2025-01-22 DIAGNOSIS — R35.0 FREQUENCY OF MICTURITION: ICD-10-CM

## 2025-01-22 DIAGNOSIS — N39.0 URINARY TRACT INFECTION, SITE NOT SPECIFIED: ICD-10-CM

## 2025-01-22 DIAGNOSIS — R30.0 DYSURIA: ICD-10-CM

## 2025-01-22 PROCEDURE — 99204 OFFICE O/P NEW MOD 45 MIN: CPT

## 2025-01-22 RX ORDER — PHENAZOPYRIDINE 100 MG/1
100 TABLET, FILM COATED ORAL
Qty: 15 | Refills: 3 | Status: ACTIVE | COMMUNITY
Start: 2025-01-22 | End: 1900-01-01

## 2025-01-23 PROBLEM — N39.0 URINARY TRACT INFECTION: Status: ACTIVE | Noted: 2025-01-23

## 2025-01-24 LAB
APPEARANCE: CLEAR
BACTERIA UR CULT: NORMAL
BACTERIA: NEGATIVE /HPF
BILIRUBIN URINE: NEGATIVE
BLOOD URINE: NEGATIVE
C TRACH RRNA SPEC QL NAA+PROBE: NOT DETECTED
CAST: 0 /LPF
COLOR: YELLOW
EPITHELIAL CELLS: 1 /HPF
GLUCOSE QUALITATIVE U: NEGATIVE MG/DL
KETONES URINE: NEGATIVE MG/DL
LEUKOCYTE ESTERASE URINE: NEGATIVE
MICROSCOPIC-UA: NORMAL
N GONORRHOEA RRNA SPEC QL NAA+PROBE: NOT DETECTED
NITRITE URINE: NEGATIVE
PH URINE: 5.5
PROTEIN URINE: NEGATIVE MG/DL
RED BLOOD CELLS URINE: 0 /HPF
SOURCE AMPLIFICATION: NORMAL
SOURCE AMPLIFICATION: NORMAL
SPECIFIC GRAVITY URINE: 1.02
T VAGINALIS RRNA SPEC QL NAA+PROBE: NOT DETECTED
URINE CYTOLOGY: NORMAL
UROBILINOGEN URINE: 0.2 MG/DL
WHITE BLOOD CELLS URINE: 0 /HPF

## 2025-01-25 ENCOUNTER — OUTPATIENT (OUTPATIENT)
Dept: OUTPATIENT SERVICES | Facility: HOSPITAL | Age: 35
LOS: 1 days | End: 2025-01-25
Payer: COMMERCIAL

## 2025-01-25 ENCOUNTER — APPOINTMENT (OUTPATIENT)
Dept: ULTRASOUND IMAGING | Facility: CLINIC | Age: 35
End: 2025-01-25
Payer: COMMERCIAL

## 2025-01-25 DIAGNOSIS — M51.26 OTHER INTERVERTEBRAL DISC DISPLACEMENT, LUMBAR REGION: Chronic | ICD-10-CM

## 2025-01-25 DIAGNOSIS — N20.0 CALCULUS OF KIDNEY: Chronic | ICD-10-CM

## 2025-01-25 DIAGNOSIS — R39.15 URGENCY OF URINATION: ICD-10-CM

## 2025-01-25 DIAGNOSIS — Z98.890 OTHER SPECIFIED POSTPROCEDURAL STATES: Chronic | ICD-10-CM

## 2025-01-25 PROCEDURE — 76770 US EXAM ABDO BACK WALL COMP: CPT | Mod: 26

## 2025-01-25 PROCEDURE — 76770 US EXAM ABDO BACK WALL COMP: CPT

## 2025-01-27 RX ORDER — METRONIDAZOLE 500 MG/1
500 TABLET ORAL TWICE DAILY
Qty: 14 | Refills: 0 | Status: ACTIVE | COMMUNITY
Start: 2025-01-27 | End: 1900-01-01

## 2025-01-29 LAB
MYCOPLASMA HOMINIS CULTURE: NEGATIVE
UREAPLASMA CULTURE: NEGATIVE

## 2025-02-04 ENCOUNTER — NON-APPOINTMENT (OUTPATIENT)
Age: 35
End: 2025-02-04

## 2025-02-04 ENCOUNTER — ASOB RESULT (OUTPATIENT)
Age: 35
End: 2025-02-04

## 2025-02-04 ENCOUNTER — APPOINTMENT (OUTPATIENT)
Dept: ANTEPARTUM | Facility: CLINIC | Age: 35
End: 2025-02-04
Payer: COMMERCIAL

## 2025-02-04 ENCOUNTER — APPOINTMENT (OUTPATIENT)
Dept: OBGYN | Facility: CLINIC | Age: 35
End: 2025-02-04
Payer: COMMERCIAL

## 2025-02-04 PROCEDURE — 99214 OFFICE O/P EST MOD 30 MIN: CPT

## 2025-02-04 PROCEDURE — 76801 OB US < 14 WKS SINGLE FETUS: CPT

## 2025-02-05 ENCOUNTER — APPOINTMENT (OUTPATIENT)
Dept: OBGYN | Facility: CLINIC | Age: 35
End: 2025-02-05
Payer: COMMERCIAL

## 2025-02-05 VITALS
DIASTOLIC BLOOD PRESSURE: 76 MMHG | HEIGHT: 63 IN | WEIGHT: 176 LBS | SYSTOLIC BLOOD PRESSURE: 110 MMHG | BODY MASS INDEX: 31.18 KG/M2

## 2025-02-05 DIAGNOSIS — O03.9 COMPLETE OR UNSPECIFIED SPONTANEOUS ABORTION W/OUT COMPLICATION: ICD-10-CM

## 2025-02-05 DIAGNOSIS — O02.1 MISSED ABORTION: ICD-10-CM

## 2025-02-05 PROCEDURE — S0190: CPT

## 2025-02-05 PROCEDURE — 99214 OFFICE O/P EST MOD 30 MIN: CPT

## 2025-02-05 RX ORDER — ONDANSETRON 8 MG/1
8 TABLET, ORALLY DISINTEGRATING ORAL EVERY 8 HOURS
Qty: 5 | Refills: 0 | Status: ACTIVE | COMMUNITY
Start: 2025-02-05 | End: 1900-01-01

## 2025-02-05 RX ORDER — MISOPROSTOL 200 UG/1
200 TABLET ORAL
Qty: 4 | Refills: 0 | Status: ACTIVE | COMMUNITY
Start: 2025-02-05 | End: 1900-01-01

## 2025-02-05 RX ORDER — HYDROCODONE BITARTRATE AND ACETAMINOPHEN 5; 325 MG/1; MG/1
5-325 TABLET ORAL
Qty: 10 | Refills: 0 | Status: ACTIVE | COMMUNITY
Start: 2025-02-05 | End: 1900-01-01

## 2025-02-05 RX ORDER — IBUPROFEN 800 MG/1
800 TABLET ORAL
Qty: 20 | Refills: 0 | Status: ACTIVE | COMMUNITY
Start: 2025-02-05 | End: 1900-01-01

## 2025-02-07 ENCOUNTER — APPOINTMENT (OUTPATIENT)
Dept: UROLOGY | Facility: CLINIC | Age: 35
End: 2025-02-07

## 2025-02-08 RX ORDER — MIFEPRISTONE 200 MG/1
200 TABLET ORAL
Refills: 0 | Status: COMPLETED | OUTPATIENT
Start: 2025-02-08

## 2025-02-08 RX ADMIN — Medication 0 MG: at 00:00

## 2025-02-17 ENCOUNTER — ASOB RESULT (OUTPATIENT)
Age: 35
End: 2025-02-17

## 2025-02-17 ENCOUNTER — NON-APPOINTMENT (OUTPATIENT)
Age: 35
End: 2025-02-17

## 2025-02-17 ENCOUNTER — APPOINTMENT (OUTPATIENT)
Dept: OBGYN | Facility: CLINIC | Age: 35
End: 2025-02-17
Payer: COMMERCIAL

## 2025-02-17 ENCOUNTER — APPOINTMENT (OUTPATIENT)
Dept: ANTEPARTUM | Facility: CLINIC | Age: 35
End: 2025-02-17
Payer: COMMERCIAL

## 2025-02-17 VITALS
BODY MASS INDEX: 31.36 KG/M2 | WEIGHT: 177 LBS | SYSTOLIC BLOOD PRESSURE: 118 MMHG | HEIGHT: 63 IN | DIASTOLIC BLOOD PRESSURE: 70 MMHG

## 2025-02-17 DIAGNOSIS — O03.9 COMPLETE OR UNSPECIFIED SPONTANEOUS ABORTION W/OUT COMPLICATION: ICD-10-CM

## 2025-02-17 DIAGNOSIS — R35.0 FREQUENCY OF MICTURITION: ICD-10-CM

## 2025-02-17 DIAGNOSIS — R30.0 DYSURIA: ICD-10-CM

## 2025-02-17 DIAGNOSIS — Z87.59 PERSONAL HISTORY OF OTHER COMPLICATIONS OF PREGNANCY, CHILDBIRTH AND THE PUERPERIUM: ICD-10-CM

## 2025-02-17 DIAGNOSIS — O02.1 MISSED ABORTION: ICD-10-CM

## 2025-02-17 LAB
APPEARANCE: CLEAR
BILIRUBIN URINE: NEGATIVE
BLOOD URINE: ABNORMAL
COLOR: YELLOW
GLUCOSE QUALITATIVE U: NEGATIVE
KETONES URINE: NEGATIVE
LEUKOCYTE ESTERASE URINE: NEGATIVE
NITRITE URINE: NEGATIVE
PH URINE: 5.5
PROTEIN URINE: NEGATIVE
SPECIFIC GRAVITY URINE: 1.02
UROBILINOGEN URINE: 0.2 (ref 0.2–?)

## 2025-02-17 PROCEDURE — 76857 US EXAM PELVIC LIMITED: CPT | Mod: 59

## 2025-02-17 PROCEDURE — 76830 TRANSVAGINAL US NON-OB: CPT

## 2025-02-17 PROCEDURE — 99213 OFFICE O/P EST LOW 20 MIN: CPT

## 2025-02-26 ENCOUNTER — APPOINTMENT (OUTPATIENT)
Dept: UROLOGY | Facility: CLINIC | Age: 35
End: 2025-02-26
Payer: COMMERCIAL

## 2025-02-26 PROCEDURE — 51701 INSERT BLADDER CATHETER: CPT

## 2025-03-17 NOTE — OB RN DELIVERY SUMMARY - NS_LABORANALGESIA_OBGYN_ALL_OB
3. Mixed Hyperlipidemia:  - Continue atorvastatin 40mg daily  - Emphasized medication compliance      Orders:    atorvastatin (LIPITOR) 40 mg tablet; Take 1 tablet (40 mg total) by mouth daily    
- Blood pressure low at 98/70  - Discontinue lisinopril due to hypotension  - Order CMP  - Follow up in 3 months      Orders:    Comprehensive metabolic panel; Future    
Occupational Therapy  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 Patient/Caregiver will be independent with home exercise program  2. Pt will display increased tolerance for wearing different textures of clothing (jeans) by 50% as reported by parent. ---  3. Pt will display decreased UE fatigue during writing tasks by 50% as reported by pt. ---Trialed weights and slant board during writing task. Results were clearer then typical homework. Pt displayed more dissociation of wrist and forearm with weight placed on forearm. 4. Pt will complete bilateral integration tasks with 80% accuracy, 10x.--- Pt completed crab walking task for ~30 ft.   5. Pt will display increased VMI skills by placing letters on baseline 80% of the time, 4/5x. --- 50% of the time. Pt copied a near copy with 75% accuracy (skipped letters/ words). Pt completed maze with 1/4'' borders, 2 errors. 6. Pt will add 3-4 new foods to diet as reported by caregiver. ---  7. Pt will dribble a playground sized ball at least 5x (2/2x) with alternating hands, 4/5 trials.  --- Pt completed zoomball task 30x.   8. Pt will correctly express self (ie: tired rather than don't feel good) with min verbal/ visual prompting 80% of time as reported by
caregiver. ---  9. Pt will sleep soundly throughout the night 4/7days per week, for 2 weeks, as reported by parent. --- Pt stated he has been sleeping well since they moved the furniture around in his room.     EDUCATION  Education provided to patient/family/caregiver:      [x]Yes/New education    [x]Yes/Continued Review of prior education   __No  If yes Education Provided: Education on [employer] skills and coordination. Method of Education:     [x]Discussion     []Demonstration    [] Written     []Other  Evaluation of Patients Response to Education:         [x]Patient and or caregiver verbalized understanding  []Patient and or Caregiver Demonstrated without assistance   []Patient and or Caregiver Demonstrated with assistance  []Needs additional instruction to demonstrate understanding of education  ASSESSMENT  Patient tolerated todays treatment session:    [x] Good   []  Fair   []  Poor  Limitations/difficulties with treatment session due to:   []Pain     []Fatigue     []Other medical complications     []Other  Goal Assessment: [] No Change    [x]Improved  Comments:  PLAN  [x]Continue with current plan of care  [][facility]  [][facility] per patient request  [] Change Treatment plan:  [] Insurance hold  __ Other     TIME   Time Treatment session was INITIATED 8:19   Time Treatment session was STOPPED 9:30       Total TIMED minutes 41   Total UNTIMED minutes 0   Total TREATMENT minutes 41     Charges: TA3  Electronically signed by:     ISMAEL Willis/L              Date:12/8/2017
None

## 2025-04-02 PROBLEM — F43.20 GRIEF REACTION: Status: ACTIVE | Noted: 2025-01-16

## 2025-04-08 ENCOUNTER — APPOINTMENT (OUTPATIENT)
Dept: FAMILY MEDICINE | Facility: CLINIC | Age: 35
End: 2025-04-08
Payer: COMMERCIAL

## 2025-04-08 VITALS
OXYGEN SATURATION: 98 % | HEART RATE: 85 BPM | DIASTOLIC BLOOD PRESSURE: 70 MMHG | BODY MASS INDEX: 30.83 KG/M2 | HEIGHT: 63 IN | TEMPERATURE: 97.7 F | SYSTOLIC BLOOD PRESSURE: 100 MMHG | RESPIRATION RATE: 16 BRPM | WEIGHT: 174 LBS

## 2025-04-08 DIAGNOSIS — O03.9 COMPLETE OR UNSPECIFIED SPONTANEOUS ABORTION W/OUT COMPLICATION: ICD-10-CM

## 2025-04-08 DIAGNOSIS — Z13.1 ENCOUNTER FOR SCREENING FOR DIABETES MELLITUS: ICD-10-CM

## 2025-04-08 DIAGNOSIS — Z32.01 ENCOUNTER FOR PREGNANCY TEST, RESULT POSITIVE: ICD-10-CM

## 2025-04-08 DIAGNOSIS — Z87.42 PERSONAL HISTORY OF OTHER DISEASES OF THE FEMALE GENITAL TRACT: ICD-10-CM

## 2025-04-08 DIAGNOSIS — Z13.220 ENCOUNTER FOR SCREENING FOR LIPOID DISORDERS: ICD-10-CM

## 2025-04-08 DIAGNOSIS — Z87.898 PERSONAL HISTORY OF OTHER SPECIFIED CONDITIONS: ICD-10-CM

## 2025-04-08 DIAGNOSIS — N92.0 EXCESSIVE AND FREQUENT MENSTRUATION WITH REGULAR CYCLE: ICD-10-CM

## 2025-04-08 DIAGNOSIS — Z34.91 ENCOUNTER FOR SUPERVISION OF NORMAL PREGNANCY, UNSPECIFIED, FIRST TRIMESTER: ICD-10-CM

## 2025-04-08 DIAGNOSIS — Z87.440 PERSONAL HISTORY OF URINARY (TRACT) INFECTIONS: ICD-10-CM

## 2025-04-08 DIAGNOSIS — Z87.59 PERSONAL HISTORY OF OTHER COMPLICATIONS OF PREGNANCY, CHILDBIRTH AND THE PUERPERIUM: ICD-10-CM

## 2025-04-08 PROCEDURE — 99214 OFFICE O/P EST MOD 30 MIN: CPT

## 2025-04-09 ENCOUNTER — NON-APPOINTMENT (OUTPATIENT)
Age: 35
End: 2025-04-09

## 2025-04-14 ENCOUNTER — ASOB RESULT (OUTPATIENT)
Age: 35
End: 2025-04-14

## 2025-04-14 ENCOUNTER — APPOINTMENT (OUTPATIENT)
Dept: MATERNAL FETAL MEDICINE | Facility: CLINIC | Age: 35
End: 2025-04-14
Payer: COMMERCIAL

## 2025-04-14 ENCOUNTER — LABORATORY RESULT (OUTPATIENT)
Age: 35
End: 2025-04-14

## 2025-04-14 LAB
BASOPHILS # BLD AUTO: 0.05 K/UL
BASOPHILS NFR BLD AUTO: 0.8 %
EOSINOPHIL # BLD AUTO: 0.21 K/UL
EOSINOPHIL NFR BLD AUTO: 3.3 %
ESTIMATED AVERAGE GLUCOSE: 111 MG/DL
HBA1C MFR BLD HPLC: 5.5 %
HCT VFR BLD CALC: 42.2 %
HGB BLD-MCNC: 13.5 G/DL
IMM GRANULOCYTES NFR BLD AUTO: 0.3 %
INSULIN P FAST SERPL-ACNC: 28.8 UU/ML
LUPUS ANTICOAGULANT CASCADE REFLEX: NORMAL
LYMPHOCYTES # BLD AUTO: 2.26 K/UL
LYMPHOCYTES NFR BLD AUTO: 35.4 %
MAN DIFF?: NORMAL
MCHC RBC-ENTMCNC: 28.2 PG
MCHC RBC-ENTMCNC: 32 G/DL
MCV RBC AUTO: 88.3 FL
MONOCYTES # BLD AUTO: 0.37 K/UL
MONOCYTES NFR BLD AUTO: 5.8 %
NEUTROPHILS # BLD AUTO: 3.47 K/UL
NEUTROPHILS NFR BLD AUTO: 54.4 %
PLATELET # BLD AUTO: 239 K/UL
RBC # BLD: 4.78 M/UL
RBC # FLD: 13.6 %
WBC # FLD AUTO: 6.38 K/UL

## 2025-04-14 PROCEDURE — 99212 OFFICE O/P EST SF 10 MIN: CPT | Mod: 95

## 2025-04-15 LAB
25(OH)D3 SERPL-MCNC: 25 NG/ML
ALBUMIN SERPL ELPH-MCNC: 4.2 G/DL
ALP BLD-CCNC: 66 U/L
ALT SERPL-CCNC: 25 U/L
ANION GAP SERPL CALC-SCNC: 11 MMOL/L
AST SERPL-CCNC: 22 U/L
BILIRUB SERPL-MCNC: 0.8 MG/DL
BUN SERPL-MCNC: 12 MG/DL
CALCIUM SERPL-MCNC: 9.3 MG/DL
CARDIOLIPIN IGM SER-MCNC: <1.5 MPL U/ML
CARDIOLIPIN IGM SER-MCNC: <1.6 GPL U/ML
CHLORIDE SERPL-SCNC: 105 MMOL/L
CHOLEST SERPL-MCNC: 246 MG/DL
CO2 SERPL-SCNC: 23 MMOL/L
CORTIS SERPL-MCNC: 11 UG/DL
CREAT SERPL-MCNC: 0.74 MG/DL
EGFRCR SERPLBLD CKD-EPI 2021: 109 ML/MIN/1.73M2
ESTROGEN SERPL-MCNC: 181 PG/ML
FERRITIN SERPL-MCNC: 61 NG/ML
FOLATE SERPL-MCNC: >20 NG/ML
FSH SERPL-MCNC: 7.3 IU/L
GLUCOSE SERPL-MCNC: 106 MG/DL
HDLC SERPL-MCNC: 43 MG/DL
IRON SATN MFR SERPL: 27 %
IRON SERPL-MCNC: 84 UG/DL
LDLC SERPL-MCNC: 137 MG/DL
LH SERPL-ACNC: 3.7 IU/L
NONHDLC SERPL-MCNC: 203 MG/DL
POTASSIUM SERPL-SCNC: 4.4 MMOL/L
PROGEST SERPL-MCNC: 0.3 NG/ML
PROLACTIN SERPL-MCNC: 7 NG/ML
PROT SERPL-MCNC: 7.1 G/DL
SODIUM SERPL-SCNC: 140 MMOL/L
T3FREE SERPL-MCNC: 2.87 PG/ML
T4 FREE SERPL-MCNC: 1 NG/DL
TESTOST FREE SERPL-MCNC: 1.9 PG/ML
TESTOST SERPL-MCNC: 22.5 NG/DL
THYROGLOB AB SERPL-ACNC: <15 IU/ML
THYROPEROXIDASE AB SERPL IA-ACNC: 23.1 IU/ML
TIBC SERPL-MCNC: 309 UG/DL
TRIGL SERPL-MCNC: 363 MG/DL
TSH SERPL-ACNC: 2.92 UIU/ML
UIBC SERPL-MCNC: 225 UG/DL
VIT B12 SERPL-MCNC: 674 PG/ML

## 2025-04-17 LAB — ANA SER IF-ACNC: NEGATIVE

## 2025-04-21 ENCOUNTER — APPOINTMENT (OUTPATIENT)
Dept: FAMILY MEDICINE | Facility: CLINIC | Age: 35
End: 2025-04-21
Payer: COMMERCIAL

## 2025-04-21 VITALS
HEART RATE: 87 BPM | SYSTOLIC BLOOD PRESSURE: 100 MMHG | TEMPERATURE: 97.9 F | WEIGHT: 176 LBS | RESPIRATION RATE: 16 BRPM | HEIGHT: 63 IN | BODY MASS INDEX: 31.18 KG/M2 | OXYGEN SATURATION: 97 % | DIASTOLIC BLOOD PRESSURE: 68 MMHG

## 2025-04-21 DIAGNOSIS — Z71.89 OTHER SPECIFIED COUNSELING: ICD-10-CM

## 2025-04-21 DIAGNOSIS — E78.5 HYPERLIPIDEMIA, UNSPECIFIED: ICD-10-CM

## 2025-04-21 DIAGNOSIS — F43.20 ADJUSTMENT DISORDER, UNSPECIFIED: ICD-10-CM

## 2025-04-21 DIAGNOSIS — R79.1 ABNORMAL COAGULATION PROFILE: ICD-10-CM

## 2025-04-21 DIAGNOSIS — Z13.220 ENCOUNTER FOR SCREENING FOR LIPOID DISORDERS: ICD-10-CM

## 2025-04-21 DIAGNOSIS — E88.819 INSULIN RESISTANCE, UNSPECIFIED: ICD-10-CM

## 2025-04-21 DIAGNOSIS — E66.811 OBESITY, CLASS 1: ICD-10-CM

## 2025-04-21 DIAGNOSIS — Z13.1 ENCOUNTER FOR SCREENING FOR DIABETES MELLITUS: ICD-10-CM

## 2025-04-21 DIAGNOSIS — F32.A DEPRESSION, UNSPECIFIED: ICD-10-CM

## 2025-04-21 DIAGNOSIS — E55.9 VITAMIN D DEFICIENCY, UNSPECIFIED: ICD-10-CM

## 2025-04-21 PROCEDURE — 99214 OFFICE O/P EST MOD 30 MIN: CPT

## 2025-04-21 PROCEDURE — 99401 PREV MED CNSL INDIV APPRX 15: CPT | Mod: 25

## 2025-04-21 PROCEDURE — G0444 DEPRESSION SCREEN ANNUAL: CPT | Mod: 59

## 2025-04-21 PROCEDURE — G2211 COMPLEX E/M VISIT ADD ON: CPT | Mod: NC

## 2025-05-13 ENCOUNTER — APPOINTMENT (OUTPATIENT)
Dept: HUMAN REPRODUCTION | Facility: CLINIC | Age: 35
End: 2025-05-13
Payer: COMMERCIAL

## 2025-05-13 PROCEDURE — 36415 COLL VENOUS BLD VENIPUNCTURE: CPT

## 2025-05-13 PROCEDURE — 76830 TRANSVAGINAL US NON-OB: CPT

## 2025-05-13 PROCEDURE — 99205 OFFICE O/P NEW HI 60 MIN: CPT | Mod: 25

## 2025-05-20 ENCOUNTER — APPOINTMENT (OUTPATIENT)
Dept: HUMAN REPRODUCTION | Facility: CLINIC | Age: 35
End: 2025-05-20
Payer: COMMERCIAL

## 2025-05-20 PROCEDURE — 99213 OFFICE O/P EST LOW 20 MIN: CPT | Mod: 25

## 2025-05-20 PROCEDURE — 76831 ECHO EXAM UTERUS: CPT

## 2025-05-20 PROCEDURE — 58340 CATHETER FOR HYSTEROGRAPHY: CPT

## 2025-05-20 PROCEDURE — 58999I: CUSTOM

## 2025-05-20 PROCEDURE — 74740 X-RAY FEMALE GENITAL TRACT: CPT

## 2025-06-05 ENCOUNTER — APPOINTMENT (OUTPATIENT)
Dept: CARDIOLOGY | Facility: CLINIC | Age: 35
End: 2025-06-05
Payer: COMMERCIAL

## 2025-06-05 ENCOUNTER — NON-APPOINTMENT (OUTPATIENT)
Age: 35
End: 2025-06-05

## 2025-06-05 VITALS
HEIGHT: 63 IN | WEIGHT: 166 LBS | HEART RATE: 80 BPM | DIASTOLIC BLOOD PRESSURE: 75 MMHG | BODY MASS INDEX: 29.41 KG/M2 | SYSTOLIC BLOOD PRESSURE: 123 MMHG

## 2025-06-05 PROCEDURE — 93000 ELECTROCARDIOGRAM COMPLETE: CPT

## 2025-06-05 PROCEDURE — 99204 OFFICE O/P NEW MOD 45 MIN: CPT | Mod: 25

## 2025-06-16 ENCOUNTER — OUTPATIENT (OUTPATIENT)
Dept: OUTPATIENT SERVICES | Facility: HOSPITAL | Age: 35
LOS: 1 days | Discharge: HOME | End: 2025-06-16

## 2025-06-16 DIAGNOSIS — N20.0 CALCULUS OF KIDNEY: Chronic | ICD-10-CM

## 2025-06-16 DIAGNOSIS — M51.26 OTHER INTERVERTEBRAL DISC DISPLACEMENT, LUMBAR REGION: Chronic | ICD-10-CM

## 2025-06-16 DIAGNOSIS — D64.9 ANEMIA, UNSPECIFIED: ICD-10-CM

## 2025-06-16 DIAGNOSIS — Z98.890 OTHER SPECIFIED POSTPROCEDURAL STATES: Chronic | ICD-10-CM

## 2025-06-20 ENCOUNTER — APPOINTMENT (OUTPATIENT)
Dept: HUMAN REPRODUCTION | Facility: CLINIC | Age: 35
End: 2025-06-20

## 2025-06-20 ENCOUNTER — APPOINTMENT (OUTPATIENT)
Dept: HEMATOLOGY ONCOLOGY | Facility: CLINIC | Age: 35
End: 2025-06-20

## 2025-06-20 ENCOUNTER — RESULT REVIEW (OUTPATIENT)
Age: 35
End: 2025-06-20

## 2025-06-20 VITALS
HEART RATE: 100 BPM | DIASTOLIC BLOOD PRESSURE: 66 MMHG | TEMPERATURE: 97.9 F | SYSTOLIC BLOOD PRESSURE: 97 MMHG | WEIGHT: 167 LBS | BODY MASS INDEX: 29.58 KG/M2 | OXYGEN SATURATION: 96 %

## 2025-06-20 LAB
ALBUMIN SERPL ELPH-MCNC: 4.3 G/DL
ALP BLD-CCNC: 72 U/L
ALT SERPL-CCNC: 25 U/L
ANION GAP SERPL CALC-SCNC: 15 MMOL/L
AST SERPL-CCNC: 20 U/L
BASOPHILS # BLD AUTO: 0.04 K/UL — SIGNIFICANT CHANGE UP (ref 0–0.2)
BASOPHILS NFR BLD AUTO: 0.5 % — SIGNIFICANT CHANGE UP (ref 0–2)
BILIRUB SERPL-MCNC: 0.6 MG/DL
BUN SERPL-MCNC: 11 MG/DL
CALCIUM SERPL-MCNC: 10 MG/DL
CHLORIDE SERPL-SCNC: 104 MMOL/L
CO2 SERPL-SCNC: 23 MMOL/L
CREAT SERPL-MCNC: 0.75 MG/DL
EGFRCR SERPLBLD CKD-EPI 2021: 108 ML/MIN/1.73M2
EOSINOPHIL # BLD AUTO: 0.2 K/UL — SIGNIFICANT CHANGE UP (ref 0–0.5)
EOSINOPHIL NFR BLD AUTO: 2.3 % — SIGNIFICANT CHANGE UP (ref 0–6)
GLUCOSE SERPL-MCNC: 119 MG/DL
HCT VFR BLD CALC: 41.2 % — SIGNIFICANT CHANGE UP (ref 34.5–45)
HGB BLD-MCNC: 13.2 G/DL — SIGNIFICANT CHANGE UP (ref 11.5–15.5)
IMM GRANULOCYTES # BLD AUTO: 0.02 K/UL — SIGNIFICANT CHANGE UP (ref 0–0.07)
IMM GRANULOCYTES NFR BLD AUTO: 0.2 % — SIGNIFICANT CHANGE UP (ref 0–0.9)
LYMPHOCYTES # BLD AUTO: 2.87 K/UL — SIGNIFICANT CHANGE UP (ref 1–3.3)
LYMPHOCYTES NFR BLD AUTO: 33 % — SIGNIFICANT CHANGE UP (ref 13–44)
MCHC RBC-ENTMCNC: 27.2 PG — SIGNIFICANT CHANGE UP (ref 27–34)
MCHC RBC-ENTMCNC: 32 G/DL — SIGNIFICANT CHANGE UP (ref 32–36)
MCV RBC AUTO: 84.9 FL — SIGNIFICANT CHANGE UP (ref 80–100)
MONOCYTES # BLD AUTO: 0.4 K/UL — SIGNIFICANT CHANGE UP (ref 0–0.9)
MONOCYTES NFR BLD AUTO: 4.6 % — SIGNIFICANT CHANGE UP (ref 2–14)
NEUTROPHILS # BLD AUTO: 5.16 K/UL — SIGNIFICANT CHANGE UP (ref 1.8–7.4)
NEUTROPHILS NFR BLD AUTO: 59.4 % — SIGNIFICANT CHANGE UP (ref 43–77)
NRBC # BLD AUTO: 0 K/UL — SIGNIFICANT CHANGE UP (ref 0–0)
NRBC # FLD: 0 K/UL — SIGNIFICANT CHANGE UP (ref 0–0)
NRBC BLD AUTO-RTO: 0 /100 WBCS — SIGNIFICANT CHANGE UP (ref 0–0)
PLATELET # BLD AUTO: 253 K/UL — SIGNIFICANT CHANGE UP (ref 150–400)
PMV BLD: 9.3 FL — SIGNIFICANT CHANGE UP (ref 7–13)
POTASSIUM SERPL-SCNC: 4.1 MMOL/L
PROT SERPL-MCNC: 7 G/DL
RBC # BLD: 4.85 M/UL — SIGNIFICANT CHANGE UP (ref 3.8–5.2)
RBC # FLD: 12.9 % — SIGNIFICANT CHANGE UP (ref 10.3–14.5)
SODIUM SERPL-SCNC: 142 MMOL/L
WBC # BLD: 8.69 K/UL — SIGNIFICANT CHANGE UP (ref 3.8–10.5)
WBC # FLD AUTO: 8.69 K/UL — SIGNIFICANT CHANGE UP (ref 3.8–10.5)

## 2025-06-20 PROCEDURE — 99203 OFFICE O/P NEW LOW 30 MIN: CPT

## 2025-06-21 LAB
APTT 2H P 1:4 NP PPP: 34 SEC
APTT 2H P INC PPP: 33.9 SEC
APTT BLD: 40 SEC
APTT IMM NP/PRE NP PPP: 33.9 SEC
APTT INV RATIO PPP: 40 SEC
FIBRINOGEN PPP-MCNC: 246 MG/DL
INR PPP: 1.12 RATIO
NPP NORMAL POOLED PLASMA: 31 SEC
PT BLD: 13.2 SEC

## 2025-06-23 ENCOUNTER — APPOINTMENT (OUTPATIENT)
Dept: HUMAN REPRODUCTION | Facility: CLINIC | Age: 35
End: 2025-06-23

## 2025-06-23 PROCEDURE — 99215 OFFICE O/P EST HI 40 MIN: CPT | Mod: 95

## 2025-06-25 ENCOUNTER — APPOINTMENT (OUTPATIENT)
Dept: CARDIOLOGY | Facility: CLINIC | Age: 35
End: 2025-06-25
Payer: COMMERCIAL

## 2025-06-25 VITALS
HEART RATE: 88 BPM | RESPIRATION RATE: 19 BRPM | BODY MASS INDEX: 29.59 KG/M2 | DIASTOLIC BLOOD PRESSURE: 81 MMHG | SYSTOLIC BLOOD PRESSURE: 122 MMHG | HEIGHT: 63 IN | WEIGHT: 167 LBS | OXYGEN SATURATION: 98 %

## 2025-06-25 PROCEDURE — 93306 TTE W/DOPPLER COMPLETE: CPT

## 2025-06-25 PROCEDURE — 93000 ELECTROCARDIOGRAM COMPLETE: CPT

## 2025-06-25 PROCEDURE — 99214 OFFICE O/P EST MOD 30 MIN: CPT

## 2025-07-02 ENCOUNTER — APPOINTMENT (OUTPATIENT)
Dept: UROLOGY | Facility: CLINIC | Age: 35
End: 2025-07-02
Payer: COMMERCIAL

## 2025-07-02 ENCOUNTER — APPOINTMENT (OUTPATIENT)
Dept: FAMILY MEDICINE | Facility: CLINIC | Age: 35
End: 2025-07-02
Payer: COMMERCIAL

## 2025-07-02 VITALS
BODY MASS INDEX: 29.06 KG/M2 | SYSTOLIC BLOOD PRESSURE: 110 MMHG | OXYGEN SATURATION: 97 % | WEIGHT: 164 LBS | DIASTOLIC BLOOD PRESSURE: 60 MMHG | HEART RATE: 91 BPM | HEIGHT: 63 IN | TEMPERATURE: 97.6 F | RESPIRATION RATE: 12 BRPM

## 2025-07-02 VITALS
HEART RATE: 85 BPM | TEMPERATURE: 98.2 F | OXYGEN SATURATION: 99 % | DIASTOLIC BLOOD PRESSURE: 78 MMHG | BODY MASS INDEX: 28.7 KG/M2 | HEIGHT: 63 IN | SYSTOLIC BLOOD PRESSURE: 115 MMHG | WEIGHT: 162 LBS

## 2025-07-02 PROBLEM — N64.4 BREAST TENDERNESS: Status: ACTIVE | Noted: 2025-07-02

## 2025-07-02 PROBLEM — L29.9 PRURITUS: Status: ACTIVE | Noted: 2025-07-02

## 2025-07-02 LAB
BILIRUB UR QL STRIP: NEGATIVE
CLARITY UR: CLEAR
COLLECTION METHOD: NORMAL
GLUCOSE UR-MCNC: NEGATIVE
HCG UR QL: 0.2 EU/DL
HGB UR QL STRIP.AUTO: NORMAL
KETONES UR-MCNC: 15
LEUKOCYTE ESTERASE UR QL STRIP: NEGATIVE
NITRITE UR QL STRIP: NEGATIVE
PH UR STRIP: 6
PROT UR STRIP-MCNC: NORMAL
SP GR UR STRIP: 1.02

## 2025-07-02 PROCEDURE — 99213 OFFICE O/P EST LOW 20 MIN: CPT | Mod: 25

## 2025-07-02 PROCEDURE — 99213 OFFICE O/P EST LOW 20 MIN: CPT

## 2025-07-02 PROCEDURE — 81003 URINALYSIS AUTO W/O SCOPE: CPT | Mod: QW

## 2025-07-02 PROCEDURE — G2211 COMPLEX E/M VISIT ADD ON: CPT | Mod: NC

## 2025-07-02 RX ORDER — CLOTRIMAZOLE 1% ANTIFUNGAL CREAM 1 G/100G
1 CREAM TOPICAL 3 TIMES DAILY
Qty: 1 | Refills: 0 | Status: ACTIVE | COMMUNITY
Start: 2025-07-02 | End: 1900-01-01

## 2025-07-02 RX ORDER — CEFUROXIME AXETIL 500 MG/1
500 TABLET, FILM COATED ORAL
Qty: 20 | Refills: 0 | Status: ACTIVE | COMMUNITY
Start: 2025-07-02 | End: 1900-01-01

## 2025-07-03 ENCOUNTER — APPOINTMENT (OUTPATIENT)
Dept: HEMATOLOGY ONCOLOGY | Facility: CLINIC | Age: 35
End: 2025-07-03

## 2025-07-03 PROCEDURE — 99212 OFFICE O/P EST SF 10 MIN: CPT | Mod: 93

## 2025-07-08 ENCOUNTER — APPOINTMENT (OUTPATIENT)
Dept: HUMAN REPRODUCTION | Facility: CLINIC | Age: 35
End: 2025-07-08
Payer: COMMERCIAL

## 2025-07-08 PROCEDURE — 36415 COLL VENOUS BLD VENIPUNCTURE: CPT

## 2025-07-14 ENCOUNTER — APPOINTMENT (OUTPATIENT)
Dept: OBGYN | Facility: CLINIC | Age: 35
End: 2025-07-14
Payer: COMMERCIAL

## 2025-07-14 VITALS
SYSTOLIC BLOOD PRESSURE: 102 MMHG | WEIGHT: 164 LBS | HEIGHT: 63 IN | BODY MASS INDEX: 29.06 KG/M2 | DIASTOLIC BLOOD PRESSURE: 60 MMHG

## 2025-07-14 DIAGNOSIS — R79.1 ABNORMAL COAGULATION PROFILE: ICD-10-CM

## 2025-07-14 PROBLEM — L73.9 FOLLICULITIS: Status: ACTIVE | Noted: 2025-07-14

## 2025-07-14 PROBLEM — L73.1 INGROWN HAIR: Status: ACTIVE | Noted: 2025-07-14

## 2025-07-14 PROBLEM — N89.8 VAGINAL ITCHING: Status: ACTIVE | Noted: 2025-07-14

## 2025-07-14 PROCEDURE — 99459 PELVIC EXAMINATION: CPT | Mod: NC

## 2025-07-14 PROCEDURE — 99213 OFFICE O/P EST LOW 20 MIN: CPT

## 2025-07-14 RX ORDER — FLUCONAZOLE 150 MG/1
150 TABLET ORAL
Qty: 2 | Refills: 0 | Status: ACTIVE | COMMUNITY
Start: 2025-07-14 | End: 1900-01-01

## 2025-07-14 RX ORDER — SULFAMETHOXAZOLE AND TRIMETHOPRIM 800; 160 MG/1; MG/1
800-160 TABLET ORAL TWICE DAILY
Qty: 14 | Refills: 0 | Status: ACTIVE | COMMUNITY
Start: 2025-07-14 | End: 1900-01-01

## 2025-07-16 ENCOUNTER — APPOINTMENT (OUTPATIENT)
Dept: OBGYN | Facility: CLINIC | Age: 35
End: 2025-07-16
Payer: COMMERCIAL

## 2025-07-16 ENCOUNTER — LABORATORY RESULT (OUTPATIENT)
Age: 35
End: 2025-07-16

## 2025-07-16 VITALS
DIASTOLIC BLOOD PRESSURE: 66 MMHG | BODY MASS INDEX: 28.88 KG/M2 | HEIGHT: 63 IN | SYSTOLIC BLOOD PRESSURE: 100 MMHG | WEIGHT: 163 LBS

## 2025-07-16 PROBLEM — N90.89 LESION OF VULVA: Status: ACTIVE | Noted: 2025-07-16

## 2025-07-16 LAB
BV BACTERIA RRNA VAG QL NAA+PROBE: NOT DETECTED
C GLABRATA RNA VAG QL NAA+PROBE: NOT DETECTED
C TRACH RRNA SPEC QL NAA+PROBE: NOT DETECTED
CANDIDA RRNA VAG QL PROBE: NOT DETECTED
N GONORRHOEA RRNA SPEC QL NAA+PROBE: NOT DETECTED
T VAGINALIS RRNA SPEC QL NAA+PROBE: NOT DETECTED

## 2025-07-16 PROCEDURE — 99459 PELVIC EXAMINATION: CPT | Mod: NC

## 2025-07-16 PROCEDURE — 99214 OFFICE O/P EST MOD 30 MIN: CPT

## 2025-07-16 RX ORDER — VALACYCLOVIR 500 MG/1
500 TABLET, FILM COATED ORAL
Qty: 10 | Refills: 1 | Status: ACTIVE | COMMUNITY
Start: 2025-07-16 | End: 1900-01-01

## 2025-07-17 ENCOUNTER — NON-APPOINTMENT (OUTPATIENT)
Age: 35
End: 2025-07-17

## 2025-07-17 PROBLEM — Z00.00 ENCOUNTER FOR PREVENTIVE HEALTH EXAMINATION: Status: ACTIVE | Noted: 2025-07-17

## 2025-07-21 ENCOUNTER — EMERGENCY (EMERGENCY)
Facility: HOSPITAL | Age: 35
LOS: 0 days | Discharge: HOME | End: 2025-07-22
Attending: EMERGENCY MEDICINE
Payer: COMMERCIAL

## 2025-07-21 VITALS — HEIGHT: 63 IN

## 2025-07-21 DIAGNOSIS — R06.00 DYSPNEA, UNSPECIFIED: ICD-10-CM

## 2025-07-21 DIAGNOSIS — R00.0 TACHYCARDIA, UNSPECIFIED: ICD-10-CM

## 2025-07-21 DIAGNOSIS — J06.9 ACUTE UPPER RESPIRATORY INFECTION, UNSPECIFIED: ICD-10-CM

## 2025-07-21 DIAGNOSIS — R10.811 RIGHT UPPER QUADRANT ABDOMINAL TENDERNESS: ICD-10-CM

## 2025-07-21 DIAGNOSIS — R60.0 LOCALIZED EDEMA: ICD-10-CM

## 2025-07-21 LAB
ALBUMIN SERPL ELPH-MCNC: 3.4 G/DL — SIGNIFICANT CHANGE UP (ref 3.3–5)
ALP SERPL-CCNC: 149 U/L — HIGH (ref 40–120)
ALT FLD-CCNC: 177 U/L — HIGH (ref 12–78)
ANION GAP SERPL CALC-SCNC: 8 MMOL/L — SIGNIFICANT CHANGE UP (ref 5–17)
APPEARANCE UR: ABNORMAL
APTT BLD: 36.8 SEC — SIGNIFICANT CHANGE UP (ref 26.1–36.8)
AST SERPL-CCNC: 82 U/L — HIGH (ref 15–37)
BACTERIA # UR AUTO: NEGATIVE /HPF — SIGNIFICANT CHANGE UP
BASOPHILS # BLD AUTO: 0.04 K/UL — SIGNIFICANT CHANGE UP (ref 0–0.2)
BASOPHILS NFR BLD AUTO: 0.4 % — SIGNIFICANT CHANGE UP (ref 0–2)
BILIRUB SERPL-MCNC: 0.7 MG/DL — SIGNIFICANT CHANGE UP (ref 0.2–1.2)
BILIRUB UR-MCNC: NEGATIVE — SIGNIFICANT CHANGE UP
BUN SERPL-MCNC: 9 MG/DL — SIGNIFICANT CHANGE UP (ref 7–23)
CALCIUM SERPL-MCNC: 9.1 MG/DL — SIGNIFICANT CHANGE UP (ref 8.5–10.1)
CAST: 0 /LPF — SIGNIFICANT CHANGE UP (ref 0–4)
CHLORIDE SERPL-SCNC: 98 MMOL/L — SIGNIFICANT CHANGE UP (ref 96–108)
CO2 SERPL-SCNC: 26 MMOL/L — SIGNIFICANT CHANGE UP (ref 22–31)
COLOR SPEC: YELLOW — SIGNIFICANT CHANGE UP
CREAT SERPL-MCNC: 1.09 MG/DL — SIGNIFICANT CHANGE UP (ref 0.5–1.3)
DIFF PNL FLD: ABNORMAL
EGFR: 69 ML/MIN/1.73M2 — SIGNIFICANT CHANGE UP
EGFR: 69 ML/MIN/1.73M2 — SIGNIFICANT CHANGE UP
EOSINOPHIL # BLD AUTO: 0.05 K/UL — SIGNIFICANT CHANGE UP (ref 0–0.5)
EOSINOPHIL NFR BLD AUTO: 0.4 % — SIGNIFICANT CHANGE UP (ref 0–6)
FLUAV AG NPH QL: SIGNIFICANT CHANGE UP
FLUBV AG NPH QL: SIGNIFICANT CHANGE UP
GLUCOSE SERPL-MCNC: 128 MG/DL — HIGH (ref 70–99)
GLUCOSE UR QL: NEGATIVE MG/DL — SIGNIFICANT CHANGE UP
HCG SERPL-ACNC: <1 MIU/ML — SIGNIFICANT CHANGE UP
HCT VFR BLD CALC: 40.1 % — SIGNIFICANT CHANGE UP (ref 34.5–45)
HGB BLD-MCNC: 13.1 G/DL — SIGNIFICANT CHANGE UP (ref 11.5–15.5)
IMM GRANULOCYTES # BLD AUTO: 0.04 K/UL — SIGNIFICANT CHANGE UP (ref 0–0.07)
IMM GRANULOCYTES NFR BLD AUTO: 0.4 % — SIGNIFICANT CHANGE UP (ref 0–0.9)
INR BLD: 1.5 RATIO — HIGH (ref 0.85–1.16)
KETONES UR QL: ABNORMAL MG/DL
LACTATE SERPL-SCNC: 1.6 MMOL/L — SIGNIFICANT CHANGE UP (ref 0.7–2)
LEUKOCYTE ESTERASE UR-ACNC: NEGATIVE — SIGNIFICANT CHANGE UP
LYMPHOCYTES # BLD AUTO: 1.62 K/UL — SIGNIFICANT CHANGE UP (ref 1–3.3)
LYMPHOCYTES NFR BLD AUTO: 14.2 % — SIGNIFICANT CHANGE UP (ref 13–44)
MCHC RBC-ENTMCNC: 27.6 PG — SIGNIFICANT CHANGE UP (ref 27–34)
MCHC RBC-ENTMCNC: 32.7 G/DL — SIGNIFICANT CHANGE UP (ref 32–36)
MCV RBC AUTO: 84.4 FL — SIGNIFICANT CHANGE UP (ref 80–100)
MONOCYTES # BLD AUTO: 0.94 K/UL — HIGH (ref 0–0.9)
MONOCYTES NFR BLD AUTO: 8.3 % — SIGNIFICANT CHANGE UP (ref 2–14)
NEUTROPHILS # BLD AUTO: 8.68 K/UL — HIGH (ref 1.8–7.4)
NEUTROPHILS NFR BLD AUTO: 76.3 % — SIGNIFICANT CHANGE UP (ref 43–77)
NITRITE UR-MCNC: NEGATIVE — SIGNIFICANT CHANGE UP
NRBC # BLD AUTO: 0 K/UL — SIGNIFICANT CHANGE UP (ref 0–0)
NRBC # FLD: 0 K/UL — SIGNIFICANT CHANGE UP (ref 0–0)
NRBC BLD AUTO-RTO: 0 /100 WBCS — SIGNIFICANT CHANGE UP (ref 0–0)
NT-PROBNP SERPL-SCNC: 118 PG/ML — SIGNIFICANT CHANGE UP (ref 0–125)
PH UR: 6 — SIGNIFICANT CHANGE UP (ref 5–8)
PLATELET # BLD AUTO: 187 K/UL — SIGNIFICANT CHANGE UP (ref 150–400)
PMV BLD: 9.7 FL — SIGNIFICANT CHANGE UP (ref 7–13)
POTASSIUM SERPL-MCNC: 3.3 MMOL/L — LOW (ref 3.5–5.3)
POTASSIUM SERPL-SCNC: 3.3 MMOL/L — LOW (ref 3.5–5.3)
PROT SERPL-MCNC: 8.3 GM/DL — SIGNIFICANT CHANGE UP (ref 6–8.3)
PROT UR-MCNC: 30 MG/DL
PROTHROM AB SERPL-ACNC: 17.2 SEC — HIGH (ref 9.9–13.4)
RBC # BLD: 4.75 M/UL — SIGNIFICANT CHANGE UP (ref 3.8–5.2)
RBC # FLD: 13.2 % — SIGNIFICANT CHANGE UP (ref 10.3–14.5)
RBC CASTS # UR COMP ASSIST: 3 /HPF — SIGNIFICANT CHANGE UP (ref 0–4)
RSV RNA NPH QL NAA+NON-PROBE: SIGNIFICANT CHANGE UP
SARS-COV-2 RNA SPEC QL NAA+PROBE: SIGNIFICANT CHANGE UP
SODIUM SERPL-SCNC: 132 MMOL/L — LOW (ref 135–145)
SOURCE RESPIRATORY: SIGNIFICANT CHANGE UP
SP GR SPEC: 1.02 — SIGNIFICANT CHANGE UP (ref 1–1.03)
SQUAMOUS # UR AUTO: 16 /HPF — HIGH (ref 0–5)
TROPONIN I, HIGH SENSITIVITY RESULT: 3.04 NG/L — SIGNIFICANT CHANGE UP
UROBILINOGEN FLD QL: 1 MG/DL — SIGNIFICANT CHANGE UP (ref 0.2–1)
WBC # BLD: 11.37 K/UL — HIGH (ref 3.8–10.5)
WBC # FLD AUTO: 11.37 K/UL — HIGH (ref 3.8–10.5)
WBC UR QL: 2 /HPF — SIGNIFICANT CHANGE UP (ref 0–5)

## 2025-07-21 PROCEDURE — 71275 CT ANGIOGRAPHY CHEST: CPT | Mod: 26

## 2025-07-21 PROCEDURE — 93971 EXTREMITY STUDY: CPT | Mod: 26,RT

## 2025-07-21 PROCEDURE — 76705 ECHO EXAM OF ABDOMEN: CPT | Mod: 26

## 2025-07-21 PROCEDURE — 99285 EMERGENCY DEPT VISIT HI MDM: CPT | Mod: 25

## 2025-07-21 PROCEDURE — 93005 ELECTROCARDIOGRAM TRACING: CPT

## 2025-07-21 PROCEDURE — 99285 EMERGENCY DEPT VISIT HI MDM: CPT

## 2025-07-21 PROCEDURE — 76705 ECHO EXAM OF ABDOMEN: CPT

## 2025-07-21 PROCEDURE — 36415 COLL VENOUS BLD VENIPUNCTURE: CPT

## 2025-07-21 PROCEDURE — 83605 ASSAY OF LACTIC ACID: CPT

## 2025-07-21 PROCEDURE — 80053 COMPREHEN METABOLIC PANEL: CPT

## 2025-07-21 PROCEDURE — 83880 ASSAY OF NATRIURETIC PEPTIDE: CPT

## 2025-07-21 PROCEDURE — 96375 TX/PRO/DX INJ NEW DRUG ADDON: CPT | Mod: XU

## 2025-07-21 PROCEDURE — 93010 ELECTROCARDIOGRAM REPORT: CPT

## 2025-07-21 PROCEDURE — 84484 ASSAY OF TROPONIN QUANT: CPT

## 2025-07-21 PROCEDURE — 71275 CT ANGIOGRAPHY CHEST: CPT

## 2025-07-21 PROCEDURE — 85730 THROMBOPLASTIN TIME PARTIAL: CPT

## 2025-07-21 PROCEDURE — 87637 SARSCOV2&INF A&B&RSV AMP PRB: CPT

## 2025-07-21 PROCEDURE — 87040 BLOOD CULTURE FOR BACTERIA: CPT

## 2025-07-21 PROCEDURE — 84702 CHORIONIC GONADOTROPIN TEST: CPT

## 2025-07-21 PROCEDURE — 81001 URINALYSIS AUTO W/SCOPE: CPT

## 2025-07-21 PROCEDURE — 85610 PROTHROMBIN TIME: CPT

## 2025-07-21 PROCEDURE — 85025 COMPLETE CBC W/AUTO DIFF WBC: CPT

## 2025-07-21 PROCEDURE — 93971 EXTREMITY STUDY: CPT | Mod: RT

## 2025-07-21 PROCEDURE — 96374 THER/PROPH/DIAG INJ IV PUSH: CPT | Mod: XU

## 2025-07-21 RX ORDER — LIDOCAINE HYDROCHLORIDE 20 MG/ML
1 JELLY TOPICAL ONCE
Refills: 0 | Status: COMPLETED | OUTPATIENT
Start: 2025-07-21 | End: 2025-07-21

## 2025-07-21 RX ORDER — KETOROLAC TROMETHAMINE 30 MG/ML
15 INJECTION, SOLUTION INTRAMUSCULAR; INTRAVENOUS ONCE
Refills: 0 | Status: DISCONTINUED | OUTPATIENT
Start: 2025-07-21 | End: 2025-07-21

## 2025-07-21 RX ORDER — ACETAMINOPHEN 500 MG/5ML
1000 LIQUID (ML) ORAL ONCE
Refills: 0 | Status: COMPLETED | OUTPATIENT
Start: 2025-07-21 | End: 2025-07-21

## 2025-07-21 RX ADMIN — Medication 1000 MILLILITER(S): at 19:41

## 2025-07-21 RX ADMIN — LIDOCAINE HYDROCHLORIDE 1 PATCH: 20 JELLY TOPICAL at 22:58

## 2025-07-21 RX ADMIN — Medication 1000 MILLILITER(S): at 23:44

## 2025-07-21 RX ADMIN — KETOROLAC TROMETHAMINE 15 MILLIGRAM(S): 30 INJECTION, SOLUTION INTRAMUSCULAR; INTRAVENOUS at 22:59

## 2025-07-21 RX ADMIN — Medication 400 MILLIGRAM(S): at 19:41

## 2025-07-21 NOTE — ED STATDOCS - PATIENT PORTAL LINK FT
You can access the FollowMyHealth Patient Portal offered by [facility] by registering at the following website: [email or web address]. By joining [employer]’s FollowMyHealth portal, you will also be able to view your health information using other applications (apps) compatible with our system.

## 2025-07-21 NOTE — ED STATDOCS - NSFOLLOWUPINSTRUCTIONS_ED_ALL_ED_FT
You were seen in the ED for a fever, abdominal pain, chest pain, shortness of breath, cough and HA.   Your symptoms are likely due to a viral infection.     For fever/pain: Take Tylenol 650mg (Two regular strength 325 mg pills) every 4-6 hours or two extra strength 500mg tablets every 8 hours as needed for pain or fevers. Do not exceed 1000mg at one time or 4000mg in a 24 hour period. Take Motrin (also sold as Advil or Ibuprofen) 400-600 mg (two or three 200 mg over the counter pills) every 8 hours as needed for moderate pain or fevers-- take with food; do not take for more than 5 consecutive days.     Please follow-up with your primary care doctor for further evaluation - bring copies of the results from today's visit.     Return to the ED for any new or worsening symptoms.

## 2025-07-21 NOTE — ED ADULT TRIAGE NOTE - CHIEF COMPLAINT QUOTE
Ambulatory into the ed C/O intermittent episodes of SOB, R ankle swelling without falls or injuries, subjective fevers and chills. Pt has been seen by urologist and PMD and placed on abx without relief. sent for ekg

## 2025-07-21 NOTE — ED ADULT NURSE NOTE - NSFALLUNIVINTERV_ED_ALL_ED
Bed/Stretcher in lowest position, wheels locked, appropriate side rails in place/Call bell, personal items and telephone in reach/Instruct patient to call for assistance before getting out of bed/chair/stretcher/Non-slip footwear applied when patient is off stretcher/[place] to call system/Physically safe environment - no spills, clutter or unnecessary equipment/Purposeful proactive rounding/Room/bathroom lighting operational, light cord in reach

## 2025-07-21 NOTE — ED ADULT NURSE NOTE - OBJECTIVE STATEMENT
pt present with multiple medical complaints. States she has been endorsing fevers, chills, and genital "pimple" beginning on 7/2. Has been pt present with multiple medical complaints. States she has been endorsing fevers, chills, and genital "pimple" beginning on 7/2. Has been taking ABX as prescribed with no relief.

## 2025-07-21 NOTE — ED STATDOCS - OBJECTIVE STATEMENT
34 year old female with no pertinent PMHx presents to ED for evaluation of multiple symptoms. patient reports that since July 2nd she has began to have multiple complaints. patient reports that she was seen by her urologist at that time she was prescribed antibiotics for 10 days. patient reports she then saw her obstetrician gynecologist for a genital cyst/pimple last Monday and was prescribed Bactrim and Valaciclovir and was told she does not have a yeast infection. patient reports that she has had difficulty breathing, cough, chills, right sided abdominal pain, dark urine and swelling to the right ankle. no trauma, patient notes she had a work accident in 2021 and notices her ankle to become swollen again when she has an infection. no recent travel. no history of DVT or PE. no OCP usage.

## 2025-07-21 NOTE — ED STATDOCS - PHYSICAL EXAMINATION
Constitutional: NAD AOx3  Eyes: PERRL EOMI  Head: Normocephalic atraumatic  Mouth: MMM  Cardiac: regular rhythm. tachycardic.   Resp: trace expiratory wheeze bilaterally.   GI: Abd s/nd. RUQ TTP   Msk: trace right ankle edema.   Neuro: CN2-12 grossly intact, MICHAELS x 4  Skin: No visible rashes

## 2025-07-22 ENCOUNTER — APPOINTMENT (OUTPATIENT)
Dept: FAMILY MEDICINE | Facility: CLINIC | Age: 35
End: 2025-07-22

## 2025-07-22 VITALS — DIASTOLIC BLOOD PRESSURE: 65 MMHG | SYSTOLIC BLOOD PRESSURE: 101 MMHG

## 2025-07-24 ENCOUNTER — NON-APPOINTMENT (OUTPATIENT)
Age: 35
End: 2025-07-24

## 2025-07-25 ENCOUNTER — APPOINTMENT (OUTPATIENT)
Dept: FAMILY MEDICINE | Facility: CLINIC | Age: 35
End: 2025-07-25
Payer: COMMERCIAL

## 2025-07-25 VITALS
DIASTOLIC BLOOD PRESSURE: 72 MMHG | SYSTOLIC BLOOD PRESSURE: 102 MMHG | OXYGEN SATURATION: 96 % | HEART RATE: 91 BPM | BODY MASS INDEX: 28.35 KG/M2 | WEIGHT: 160 LBS | RESPIRATION RATE: 12 BRPM | HEIGHT: 63 IN | TEMPERATURE: 97.5 F

## 2025-07-25 DIAGNOSIS — R74.01 ELEVATION OF LEVELS OF LIVER TRANSAMINASE LEVELS: ICD-10-CM

## 2025-07-25 DIAGNOSIS — M25.571 PAIN IN RIGHT ANKLE AND JOINTS OF RIGHT FOOT: ICD-10-CM

## 2025-07-25 PROCEDURE — 99214 OFFICE O/P EST MOD 30 MIN: CPT

## 2025-07-26 ENCOUNTER — APPOINTMENT (OUTPATIENT)
Dept: RADIOLOGY | Facility: CLINIC | Age: 35
End: 2025-07-26

## 2025-07-26 ENCOUNTER — APPOINTMENT (OUTPATIENT)
Dept: RADIOLOGY | Facility: CLINIC | Age: 35
End: 2025-07-26
Payer: COMMERCIAL

## 2025-07-26 PROCEDURE — 73630 X-RAY EXAM OF FOOT: CPT | Mod: RT

## 2025-07-26 PROCEDURE — 73610 X-RAY EXAM OF ANKLE: CPT | Mod: RT

## 2025-07-27 LAB
CULTURE RESULTS: SIGNIFICANT CHANGE UP
CULTURE RESULTS: SIGNIFICANT CHANGE UP
SPECIMEN SOURCE: SIGNIFICANT CHANGE UP
SPECIMEN SOURCE: SIGNIFICANT CHANGE UP

## 2025-07-29 ENCOUNTER — APPOINTMENT (OUTPATIENT)
Dept: OTOLARYNGOLOGY | Facility: CLINIC | Age: 35
End: 2025-07-29
Payer: COMMERCIAL

## 2025-07-29 DIAGNOSIS — H92.02 OTALGIA, LEFT EAR: ICD-10-CM

## 2025-07-29 PROCEDURE — 99212 OFFICE O/P EST SF 10 MIN: CPT

## 2025-07-29 NOTE — ED POST DISCHARGE NOTE - DETAILS
7/29/25 CHET PAPPAS 7/31/25 saba hampton pt aware of above findings has a gi follow up was told by ed

## 2025-07-31 ENCOUNTER — APPOINTMENT (OUTPATIENT)
Dept: UROLOGY | Facility: CLINIC | Age: 35
End: 2025-07-31
Payer: COMMERCIAL

## 2025-07-31 VITALS
HEART RATE: 79 BPM | BODY MASS INDEX: 28 KG/M2 | SYSTOLIC BLOOD PRESSURE: 96 MMHG | WEIGHT: 158 LBS | OXYGEN SATURATION: 99 % | DIASTOLIC BLOOD PRESSURE: 62 MMHG | RESPIRATION RATE: 16 BRPM | HEIGHT: 63 IN | TEMPERATURE: 98.3 F

## 2025-07-31 LAB
BILIRUB UR QL STRIP: NEGATIVE
CLARITY UR: CLEAR
COLLECTION METHOD: NORMAL
GLUCOSE UR-MCNC: NEGATIVE
HCG UR QL: 0.2 EU/DL
HGB UR QL STRIP.AUTO: NEGATIVE
KETONES UR-MCNC: NEGATIVE
LEUKOCYTE ESTERASE UR QL STRIP: NEGATIVE
NITRITE UR QL STRIP: NEGATIVE
PH UR STRIP: 5.5
PROT UR STRIP-MCNC: NEGATIVE
SP GR UR STRIP: <=1.005

## 2025-07-31 PROCEDURE — 99213 OFFICE O/P EST LOW 20 MIN: CPT | Mod: 25

## 2025-07-31 PROCEDURE — 81003 URINALYSIS AUTO W/O SCOPE: CPT | Mod: QW

## 2025-07-31 PROCEDURE — 52000 CYSTOURETHROSCOPY: CPT

## 2025-07-31 RX ORDER — CIPROFLOXACIN HYDROCHLORIDE 500 MG/1
500 TABLET, FILM COATED ORAL
Refills: 0 | Status: COMPLETED | OUTPATIENT
Start: 2025-07-31

## 2025-07-31 RX ADMIN — CIPROFLOXACIN 0 MG: 500 TABLET, FILM COATED ORAL at 00:00

## 2025-08-05 ENCOUNTER — APPOINTMENT (OUTPATIENT)
Dept: HUMAN REPRODUCTION | Facility: CLINIC | Age: 35
End: 2025-08-05

## 2025-08-06 ENCOUNTER — APPOINTMENT (OUTPATIENT)
Dept: FAMILY MEDICINE | Facility: CLINIC | Age: 35
End: 2025-08-06

## 2025-08-08 ENCOUNTER — APPOINTMENT (OUTPATIENT)
Dept: FAMILY MEDICINE | Facility: CLINIC | Age: 35
End: 2025-08-08
Payer: COMMERCIAL

## 2025-08-08 VITALS
OXYGEN SATURATION: 98 % | HEART RATE: 90 BPM | SYSTOLIC BLOOD PRESSURE: 104 MMHG | RESPIRATION RATE: 12 BRPM | WEIGHT: 157 LBS | HEIGHT: 63 IN | TEMPERATURE: 97.7 F | BODY MASS INDEX: 27.82 KG/M2 | DIASTOLIC BLOOD PRESSURE: 68 MMHG

## 2025-08-08 DIAGNOSIS — M25.50 PAIN IN UNSPECIFIED JOINT: ICD-10-CM

## 2025-08-08 DIAGNOSIS — J45.909 UNSPECIFIED ASTHMA, UNCOMPLICATED: ICD-10-CM

## 2025-08-08 PROCEDURE — 99213 OFFICE O/P EST LOW 20 MIN: CPT

## 2025-08-08 RX ORDER — FLUTICASONE PROPIONATE AND SALMETEROL 100; 50 UG/1; UG/1
100-50 POWDER RESPIRATORY (INHALATION) DAILY
Refills: 0 | Status: ACTIVE | COMMUNITY
Start: 2025-08-08

## 2025-08-08 RX ORDER — ALBUTEROL SULFATE 90 UG/1
108 (90 BASE) INHALANT RESPIRATORY (INHALATION)
Refills: 0 | Status: ACTIVE | COMMUNITY
Start: 2025-08-08

## 2025-08-08 RX ORDER — PREDNISONE 20 MG/1
20 TABLET ORAL DAILY
Qty: 10 | Refills: 0 | Status: DISCONTINUED | COMMUNITY
Start: 2025-07-25 | End: 2025-08-08

## 2025-08-11 ENCOUNTER — APPOINTMENT (OUTPATIENT)
Dept: OBGYN | Facility: CLINIC | Age: 35
End: 2025-08-11
Payer: COMMERCIAL

## 2025-08-11 VITALS
BODY MASS INDEX: 28.53 KG/M2 | HEIGHT: 63 IN | WEIGHT: 161 LBS | DIASTOLIC BLOOD PRESSURE: 66 MMHG | SYSTOLIC BLOOD PRESSURE: 100 MMHG

## 2025-08-11 DIAGNOSIS — Z01.419 ENCOUNTER FOR GYNECOLOGICAL EXAMINATION (GENERAL) (ROUTINE) W/OUT ABNORMAL FINDINGS: ICD-10-CM

## 2025-08-11 PROCEDURE — 99459 PELVIC EXAMINATION: CPT | Mod: NC

## 2025-08-11 PROCEDURE — 99395 PREV VISIT EST AGE 18-39: CPT

## 2025-09-03 ENCOUNTER — APPOINTMENT (OUTPATIENT)
Dept: OBGYN | Facility: CLINIC | Age: 35
End: 2025-09-03
Payer: COMMERCIAL

## 2025-09-03 VITALS
BODY MASS INDEX: 27.46 KG/M2 | HEIGHT: 63 IN | DIASTOLIC BLOOD PRESSURE: 70 MMHG | WEIGHT: 155 LBS | SYSTOLIC BLOOD PRESSURE: 112 MMHG

## 2025-09-03 DIAGNOSIS — Z87.59 PERSONAL HISTORY OF OTHER COMPLICATIONS OF PREGNANCY, CHILDBIRTH AND THE PUERPERIUM: ICD-10-CM

## 2025-09-03 LAB
HCG UR QL: POSITIVE
QUALITY CONTROL: YES

## 2025-09-03 PROCEDURE — 99213 OFFICE O/P EST LOW 20 MIN: CPT

## 2025-09-03 PROCEDURE — 81025 URINE PREGNANCY TEST: CPT

## 2025-09-03 PROCEDURE — 36415 COLL VENOUS BLD VENIPUNCTURE: CPT

## 2025-09-04 ENCOUNTER — APPOINTMENT (OUTPATIENT)
Dept: CARDIOLOGY | Facility: CLINIC | Age: 35
End: 2025-09-04
Payer: COMMERCIAL

## 2025-09-04 VITALS
HEART RATE: 89 BPM | RESPIRATION RATE: 18 BRPM | HEIGHT: 63 IN | SYSTOLIC BLOOD PRESSURE: 122 MMHG | BODY MASS INDEX: 27.46 KG/M2 | OXYGEN SATURATION: 96 % | WEIGHT: 155 LBS | DIASTOLIC BLOOD PRESSURE: 78 MMHG

## 2025-09-04 DIAGNOSIS — R23.0 CYANOSIS: ICD-10-CM

## 2025-09-04 PROCEDURE — 99214 OFFICE O/P EST MOD 30 MIN: CPT | Mod: 25

## 2025-09-04 PROCEDURE — 93000 ELECTROCARDIOGRAM COMPLETE: CPT

## 2025-09-05 ENCOUNTER — APPOINTMENT (OUTPATIENT)
Dept: UROLOGY | Facility: CLINIC | Age: 35
End: 2025-09-05
Payer: COMMERCIAL

## 2025-09-05 VITALS
HEART RATE: 54 BPM | OXYGEN SATURATION: 100 % | SYSTOLIC BLOOD PRESSURE: 130 MMHG | HEIGHT: 63 IN | BODY MASS INDEX: 27.46 KG/M2 | WEIGHT: 155 LBS | DIASTOLIC BLOOD PRESSURE: 77 MMHG | TEMPERATURE: 98.4 F

## 2025-09-05 DIAGNOSIS — N39.0 URINARY TRACT INFECTION, SITE NOT SPECIFIED: ICD-10-CM

## 2025-09-05 PROBLEM — R23.0 PERIPHERAL CYANOSIS: Status: ACTIVE | Noted: 2025-09-03

## 2025-09-05 PROCEDURE — 81003 URINALYSIS AUTO W/O SCOPE: CPT | Mod: QW

## 2025-09-05 PROCEDURE — 99214 OFFICE O/P EST MOD 30 MIN: CPT | Mod: 25

## 2025-09-05 PROCEDURE — 99459 PELVIC EXAMINATION: CPT

## 2025-09-05 RX ORDER — AMOXICILLIN AND CLAVULANATE POTASSIUM 875; 125 MG/1; MG/1
875-125 TABLET, COATED ORAL
Qty: 14 | Refills: 0 | Status: ACTIVE | COMMUNITY
Start: 2025-09-05 | End: 1900-01-01

## 2025-09-08 ENCOUNTER — NON-APPOINTMENT (OUTPATIENT)
Age: 35
End: 2025-09-08

## 2025-09-08 LAB
APPEARANCE: CLEAR
BACTERIA UR CULT: NORMAL
BACTERIA: NEGATIVE /HPF
BASOPHILS # BLD AUTO: 0.04 K/UL
BASOPHILS NFR BLD AUTO: 0.4 %
BILIRUB UR QL STRIP: NEGATIVE
BILIRUBIN URINE: NEGATIVE
BLOOD URINE: ABNORMAL
CAST: 0 /LPF
CLARITY UR: CLEAR
COLLECTION METHOD: NORMAL
COLOR: YELLOW
EOSINOPHIL # BLD AUTO: 0.15 K/UL
EOSINOPHIL NFR BLD AUTO: 1.5 %
EPITHELIAL CELLS: 2 /HPF
GLUCOSE QUALITATIVE U: NEGATIVE MG/DL
GLUCOSE UR-MCNC: NEGATIVE
HCG UR QL: 0.2 EU/DL
HCT VFR BLD CALC: 41.2 %
HGB BLD-MCNC: 12.9 G/DL
HGB UR QL STRIP.AUTO: NORMAL
IMM GRANULOCYTES NFR BLD AUTO: 0.3 %
KETONES UR-MCNC: NEGATIVE
KETONES URINE: NEGATIVE MG/DL
LEUKOCYTE ESTERASE UR QL STRIP: NEGATIVE
LEUKOCYTE ESTERASE URINE: ABNORMAL
LYMPHOCYTES # BLD AUTO: 2.76 K/UL
LYMPHOCYTES NFR BLD AUTO: 28.5 %
MAN DIFF?: NORMAL
MCHC RBC-ENTMCNC: 26.7 PG
MCHC RBC-ENTMCNC: 31.3 G/DL
MCV RBC AUTO: 85.1 FL
MICROSCOPIC-UA: NORMAL
MONOCYTES # BLD AUTO: 0.49 K/UL
MONOCYTES NFR BLD AUTO: 5.1 %
NEUTROPHILS # BLD AUTO: 6.22 K/UL
NEUTROPHILS NFR BLD AUTO: 64.2 %
NITRITE UR QL STRIP: NEGATIVE
NITRITE URINE: NEGATIVE
PH UR STRIP: 5.5
PH URINE: 6
PLATELET # BLD AUTO: 286 K/UL
PROT UR STRIP-MCNC: NEGATIVE
PROTEIN URINE: NEGATIVE MG/DL
RBC # BLD: 4.84 M/UL
RBC # FLD: 14.5 %
RED BLOOD CELLS URINE: 3 /HPF
SP GR UR STRIP: 1.02
SPECIFIC GRAVITY URINE: 1.02
UROBILINOGEN URINE: 0.2 MG/DL
WBC # FLD AUTO: 9.69 K/UL
WHITE BLOOD CELLS URINE: 0 /HPF

## 2025-09-09 ENCOUNTER — APPOINTMENT (OUTPATIENT)
Dept: OBGYN | Facility: CLINIC | Age: 35
End: 2025-09-09
Payer: COMMERCIAL

## 2025-09-09 ENCOUNTER — EMERGENCY (EMERGENCY)
Facility: HOSPITAL | Age: 35
LOS: 0 days | Discharge: HOME | End: 2025-09-09
Attending: EMERGENCY MEDICINE
Payer: COMMERCIAL

## 2025-09-09 VITALS
WEIGHT: 158 LBS | HEIGHT: 63 IN | DIASTOLIC BLOOD PRESSURE: 72 MMHG | SYSTOLIC BLOOD PRESSURE: 112 MMHG | BODY MASS INDEX: 28 KG/M2

## 2025-09-09 VITALS
OXYGEN SATURATION: 100 % | DIASTOLIC BLOOD PRESSURE: 78 MMHG | HEART RATE: 85 BPM | TEMPERATURE: 98 F | RESPIRATION RATE: 18 BRPM | SYSTOLIC BLOOD PRESSURE: 115 MMHG

## 2025-09-09 VITALS — HEIGHT: 63 IN

## 2025-09-09 DIAGNOSIS — N20.0 CALCULUS OF KIDNEY: Chronic | ICD-10-CM

## 2025-09-09 DIAGNOSIS — M54.6 PAIN IN THORACIC SPINE: ICD-10-CM

## 2025-09-09 DIAGNOSIS — M25.512 PAIN IN LEFT SHOULDER: ICD-10-CM

## 2025-09-09 DIAGNOSIS — M51.26 OTHER INTERVERTEBRAL DISC DISPLACEMENT, LUMBAR REGION: Chronic | ICD-10-CM

## 2025-09-09 DIAGNOSIS — R06.02 SHORTNESS OF BREATH: ICD-10-CM

## 2025-09-09 DIAGNOSIS — Z32.01 ENCOUNTER FOR PREGNANCY TEST, RESULT POSITIVE: ICD-10-CM

## 2025-09-09 DIAGNOSIS — Z3A.01 LESS THAN 8 WEEKS GESTATION OF PREGNANCY: ICD-10-CM

## 2025-09-09 DIAGNOSIS — Z98.890 OTHER SPECIFIED POSTPROCEDURAL STATES: Chronic | ICD-10-CM

## 2025-09-09 DIAGNOSIS — O99.891 OTHER SPECIFIED DISEASES AND CONDITIONS COMPLICATING PREGNANCY: ICD-10-CM

## 2025-09-09 DIAGNOSIS — R07.89 OTHER CHEST PAIN: ICD-10-CM

## 2025-09-09 DIAGNOSIS — R11.0 NAUSEA: ICD-10-CM

## 2025-09-09 LAB
ADD ON TEST-SPECIMEN IN LAB: SIGNIFICANT CHANGE UP
ADD ON TEST-SPECIMEN IN LAB: SIGNIFICANT CHANGE UP
ALBUMIN SERPL ELPH-MCNC: 3.4 G/DL — SIGNIFICANT CHANGE UP (ref 3.3–5)
ALP SERPL-CCNC: 64 U/L — SIGNIFICANT CHANGE UP (ref 40–120)
ALT FLD-CCNC: 31 U/L — SIGNIFICANT CHANGE UP (ref 12–78)
ANION GAP SERPL CALC-SCNC: 4 MMOL/L — LOW (ref 5–17)
APPEARANCE UR: CLEAR — SIGNIFICANT CHANGE UP
APPEARANCE: CLEAR
APTT BLD: 36.6 SEC — SIGNIFICANT CHANGE UP (ref 26.1–36.8)
AST SERPL-CCNC: 19 U/L — SIGNIFICANT CHANGE UP (ref 15–37)
BASOPHILS # BLD AUTO: 0.03 K/UL — SIGNIFICANT CHANGE UP (ref 0–0.2)
BASOPHILS NFR BLD AUTO: 0.3 % — SIGNIFICANT CHANGE UP (ref 0–2)
BILIRUB SERPL-MCNC: 0.8 MG/DL — SIGNIFICANT CHANGE UP (ref 0.2–1.2)
BILIRUB UR-MCNC: NEGATIVE — SIGNIFICANT CHANGE UP
BILIRUBIN URINE: NEGATIVE
BLOOD URINE: NEGATIVE
BUN SERPL-MCNC: 13 MG/DL — SIGNIFICANT CHANGE UP (ref 7–23)
CALCIUM SERPL-MCNC: 9.3 MG/DL — SIGNIFICANT CHANGE UP (ref 8.5–10.1)
CHLORIDE SERPL-SCNC: 110 MMOL/L — HIGH (ref 96–108)
CO2 SERPL-SCNC: 24 MMOL/L — SIGNIFICANT CHANGE UP (ref 22–31)
COLOR SPEC: YELLOW — SIGNIFICANT CHANGE UP
COLOR: YELLOW
CREAT SERPL-MCNC: 0.83 MG/DL — SIGNIFICANT CHANGE UP (ref 0.5–1.3)
D DIMER BLD IA.RAPID-MCNC: <150 NG/ML DDU — SIGNIFICANT CHANGE UP
DIFF PNL FLD: NEGATIVE — SIGNIFICANT CHANGE UP
EGFR: 95 ML/MIN/1.73M2 — SIGNIFICANT CHANGE UP
EGFR: 95 ML/MIN/1.73M2 — SIGNIFICANT CHANGE UP
EOSINOPHIL # BLD AUTO: 0.12 K/UL — SIGNIFICANT CHANGE UP (ref 0–0.5)
EOSINOPHIL NFR BLD AUTO: 1.1 % — SIGNIFICANT CHANGE UP (ref 0–6)
FLUAV AG NPH QL: SIGNIFICANT CHANGE UP
FLUBV AG NPH QL: SIGNIFICANT CHANGE UP
GLUCOSE QUALITATIVE U: NEGATIVE
GLUCOSE SERPL-MCNC: 124 MG/DL — HIGH (ref 70–99)
GLUCOSE UR QL: NEGATIVE MG/DL — SIGNIFICANT CHANGE UP
HCT VFR BLD CALC: 40.7 % — SIGNIFICANT CHANGE UP (ref 34.5–45)
HGB BLD-MCNC: 13.1 G/DL — SIGNIFICANT CHANGE UP (ref 11.5–15.5)
IMM GRANULOCYTES # BLD AUTO: 0.04 K/UL — SIGNIFICANT CHANGE UP (ref 0–0.07)
IMM GRANULOCYTES NFR BLD AUTO: 0.4 % — SIGNIFICANT CHANGE UP (ref 0–0.9)
INR BLD: 1.2 RATIO — HIGH (ref 0.85–1.16)
KETONES UR QL: NEGATIVE MG/DL — SIGNIFICANT CHANGE UP
KETONES URINE: NEGATIVE
LEUKOCYTE ESTERASE UR-ACNC: NEGATIVE — SIGNIFICANT CHANGE UP
LEUKOCYTE ESTERASE URINE: NEGATIVE
LYMPHOCYTES # BLD AUTO: 2.01 K/UL — SIGNIFICANT CHANGE UP (ref 1–3.3)
LYMPHOCYTES NFR BLD AUTO: 18.8 % — SIGNIFICANT CHANGE UP (ref 13–44)
MCHC RBC-ENTMCNC: 27.1 PG — SIGNIFICANT CHANGE UP (ref 27–34)
MCHC RBC-ENTMCNC: 32.2 G/DL — SIGNIFICANT CHANGE UP (ref 32–36)
MCV RBC AUTO: 84.1 FL — SIGNIFICANT CHANGE UP (ref 80–100)
MONOCYTES # BLD AUTO: 0.58 K/UL — SIGNIFICANT CHANGE UP (ref 0–0.9)
MONOCYTES NFR BLD AUTO: 5.4 % — SIGNIFICANT CHANGE UP (ref 2–14)
NEUTROPHILS # BLD AUTO: 7.92 K/UL — HIGH (ref 1.8–7.4)
NEUTROPHILS NFR BLD AUTO: 74 % — SIGNIFICANT CHANGE UP (ref 43–77)
NITRITE UR-MCNC: NEGATIVE — SIGNIFICANT CHANGE UP
NITRITE URINE: NEGATIVE
NRBC # BLD AUTO: 0 K/UL — SIGNIFICANT CHANGE UP (ref 0–0)
NRBC # FLD: 0 K/UL — SIGNIFICANT CHANGE UP (ref 0–0)
NRBC BLD AUTO-RTO: 0 /100 WBCS — SIGNIFICANT CHANGE UP (ref 0–0)
NT-PROBNP SERPL-SCNC: 37 PG/ML — SIGNIFICANT CHANGE UP (ref 0–125)
PH UR: 8 — SIGNIFICANT CHANGE UP (ref 5–8)
PH URINE: 6
PLATELET # BLD AUTO: 250 K/UL — SIGNIFICANT CHANGE UP (ref 150–400)
PMV BLD: 10 FL — SIGNIFICANT CHANGE UP (ref 7–13)
POTASSIUM SERPL-MCNC: 3.9 MMOL/L — SIGNIFICANT CHANGE UP (ref 3.5–5.3)
POTASSIUM SERPL-SCNC: 3.9 MMOL/L — SIGNIFICANT CHANGE UP (ref 3.5–5.3)
PROT SERPL-MCNC: 7.2 GM/DL — SIGNIFICANT CHANGE UP (ref 6–8.3)
PROT UR-MCNC: NEGATIVE MG/DL — SIGNIFICANT CHANGE UP
PROTEIN URINE: NEGATIVE
PROTHROM AB SERPL-ACNC: 13.9 SEC — HIGH (ref 9.9–13.4)
RBC # BLD: 4.84 M/UL — SIGNIFICANT CHANGE UP (ref 3.8–5.2)
RBC # FLD: 14.4 % — SIGNIFICANT CHANGE UP (ref 10.3–14.5)
RSV RNA NPH QL NAA+NON-PROBE: SIGNIFICANT CHANGE UP
SARS-COV-2 RNA SPEC QL NAA+PROBE: SIGNIFICANT CHANGE UP
SODIUM SERPL-SCNC: 138 MMOL/L — SIGNIFICANT CHANGE UP (ref 135–145)
SOURCE RESPIRATORY: SIGNIFICANT CHANGE UP
SP GR SPEC: 1.02 — SIGNIFICANT CHANGE UP (ref 1–1.03)
SPECIFIC GRAVITY URINE: 1.02
TROPONIN I, HIGH SENSITIVITY RESULT: <3 NG/L — SIGNIFICANT CHANGE UP
UROBILINOGEN FLD QL: 0.2 MG/DL — SIGNIFICANT CHANGE UP (ref 0.2–1)
UROBILINOGEN URINE: 0.2 (ref 0.2–?)
WBC # BLD: 10.7 K/UL — HIGH (ref 3.8–10.5)
WBC # FLD AUTO: 10.7 K/UL — HIGH (ref 3.8–10.5)

## 2025-09-09 PROCEDURE — 81025 URINE PREGNANCY TEST: CPT

## 2025-09-09 PROCEDURE — 81003 URINALYSIS AUTO W/O SCOPE: CPT

## 2025-09-09 PROCEDURE — 93005 ELECTROCARDIOGRAM TRACING: CPT

## 2025-09-09 PROCEDURE — 85379 FIBRIN DEGRADATION QUANT: CPT

## 2025-09-09 PROCEDURE — 71045 X-RAY EXAM CHEST 1 VIEW: CPT

## 2025-09-09 PROCEDURE — 36000 PLACE NEEDLE IN VEIN: CPT

## 2025-09-09 PROCEDURE — 85025 COMPLETE CBC W/AUTO DIFF WBC: CPT

## 2025-09-09 PROCEDURE — 36415 COLL VENOUS BLD VENIPUNCTURE: CPT

## 2025-09-09 PROCEDURE — 85610 PROTHROMBIN TIME: CPT

## 2025-09-09 PROCEDURE — 99285 EMERGENCY DEPT VISIT HI MDM: CPT | Mod: 25

## 2025-09-09 PROCEDURE — 87637 SARSCOV2&INF A&B&RSV AMP PRB: CPT

## 2025-09-09 PROCEDURE — 83880 ASSAY OF NATRIURETIC PEPTIDE: CPT

## 2025-09-09 PROCEDURE — 84484 ASSAY OF TROPONIN QUANT: CPT

## 2025-09-09 PROCEDURE — 85730 THROMBOPLASTIN TIME PARTIAL: CPT

## 2025-09-09 PROCEDURE — 93010 ELECTROCARDIOGRAM REPORT: CPT

## 2025-09-09 PROCEDURE — 71045 X-RAY EXAM CHEST 1 VIEW: CPT | Mod: 26

## 2025-09-09 PROCEDURE — 99213 OFFICE O/P EST LOW 20 MIN: CPT

## 2025-09-09 PROCEDURE — 99285 EMERGENCY DEPT VISIT HI MDM: CPT

## 2025-09-09 PROCEDURE — 80053 COMPREHEN METABOLIC PANEL: CPT

## 2025-09-09 RX ADMIN — Medication 1000 MILLILITER(S): at 09:23

## 2025-09-10 DIAGNOSIS — D47.2 MONOCLONAL GAMMOPATHY: ICD-10-CM

## 2025-09-11 ENCOUNTER — NON-APPOINTMENT (OUTPATIENT)
Age: 35
End: 2025-09-11

## 2025-09-11 ENCOUNTER — APPOINTMENT (OUTPATIENT)
Dept: UROLOGY | Facility: CLINIC | Age: 35
End: 2025-09-11

## 2025-09-11 LAB
BV BACTERIA RRNA VAG QL NAA+PROBE: DETECTED
C GLABRATA RNA VAG QL NAA+PROBE: NOT DETECTED
C TRACH RRNA SPEC QL NAA+PROBE: NOT DETECTED
CANDIDA RRNA VAG QL PROBE: NOT DETECTED
HCG SERPL QL: POSITIVE
N GONORRHOEA RRNA SPEC QL NAA+PROBE: NOT DETECTED
PAPP-A SERPL-ACNC: 2365 MIU/ML
PROGEST SERPL-MCNC: 13.6 NG/ML
T VAGINALIS RRNA SPEC QL NAA+PROBE: NOT DETECTED

## 2025-09-11 RX ORDER — CLINDAMYCIN PHOSPHATE 20 MG/G
2 CREAM VAGINAL
Qty: 1 | Refills: 1 | Status: ACTIVE | COMMUNITY
Start: 2025-09-11 | End: 1900-01-01

## 2025-09-12 ENCOUNTER — NON-APPOINTMENT (OUTPATIENT)
Age: 35
End: 2025-09-12

## 2025-09-16 ENCOUNTER — APPOINTMENT (OUTPATIENT)
Dept: HEMATOLOGY ONCOLOGY | Facility: CLINIC | Age: 35
End: 2025-09-16

## 2025-09-16 ENCOUNTER — RESULT REVIEW (OUTPATIENT)
Age: 35
End: 2025-09-16

## 2025-09-17 LAB
ALBUMIN SERPL ELPH-MCNC: 4.1 G/DL
ALP BLD-CCNC: 74 U/L
ALT SERPL-CCNC: 28 U/L
ANION GAP SERPL CALC-SCNC: 15 MMOL/L
AST SERPL-CCNC: 17 U/L
BILIRUB SERPL-MCNC: 0.6 MG/DL
BUN SERPL-MCNC: 11 MG/DL
CALCIUM SERPL-MCNC: 9.9 MG/DL
CHLORIDE SERPL-SCNC: 105 MMOL/L
CO2 SERPL-SCNC: 20 MMOL/L
CREAT SERPL-MCNC: 0.69 MG/DL
EGFRCR SERPLBLD CKD-EPI 2021: 117 ML/MIN/1.73M2
GLUCOSE SERPL-MCNC: 107 MG/DL
POTASSIUM SERPL-SCNC: 3.9 MMOL/L
PROT SERPL-MCNC: 7.2 G/DL
SODIUM SERPL-SCNC: 140 MMOL/L

## 2025-09-19 LAB
ALBUMIN MFR SERPL ELPH: 58.2 %
ALBUMIN SERPL-MCNC: 4.1 G/DL
ALBUMIN/GLOB SERPL: 1.4 RATIO
ALPHA1 GLOB MFR SERPL ELPH: 4.3 %
ALPHA1 GLOB SERPL ELPH-MCNC: 0.3 G/DL
ALPHA2 GLOB MFR SERPL ELPH: 10.1 %
ALPHA2 GLOB SERPL ELPH-MCNC: 0.7 G/DL
B-GLOBULIN MFR SERPL ELPH: 12.3 %
B-GLOBULIN SERPL ELPH-MCNC: 0.9 G/DL
DEPRECATED KAPPA LC FREE/LAMBDA SER: 1.26 RATIO
GAMMA GLOB FLD ELPH-MCNC: 1.1 G/DL
GAMMA GLOB MFR SERPL ELPH: 15.1 %
IGA SERPL-MCNC: 190 MG/DL
IGG SERPL-MCNC: 1075 MG/DL
IGM SERPL-MCNC: 72 MG/DL
INTERPRETATION SERPL IEP-IMP: NORMAL
KAPPA LC CSF-MCNC: 0.97 MG/DL
KAPPA LC SERPL-MCNC: 1.22 MG/DL
M PROTEIN SPEC IFE-MCNC: NORMAL
PROT SERPL-MCNC: 7.1 G/DL
PROT SERPL-MCNC: 7.1 G/DL